# Patient Record
Sex: FEMALE | Race: WHITE | NOT HISPANIC OR LATINO | Employment: STUDENT | URBAN - METROPOLITAN AREA
[De-identification: names, ages, dates, MRNs, and addresses within clinical notes are randomized per-mention and may not be internally consistent; named-entity substitution may affect disease eponyms.]

---
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
## 2024-12-04 ENCOUNTER — INITIAL PRENATAL (OUTPATIENT)
Dept: OBSTETRICS AND GYNECOLOGY | Facility: CLINIC | Age: 19
End: 2024-12-04
Payer: COMMERCIAL

## 2024-12-04 VITALS — WEIGHT: 139 LBS | DIASTOLIC BLOOD PRESSURE: 60 MMHG | HEART RATE: 90 BPM | SYSTOLIC BLOOD PRESSURE: 108 MMHG

## 2024-12-04 DIAGNOSIS — Z86.19 HISTORY OF HERPES SIMPLEX INFECTION: ICD-10-CM

## 2024-12-04 DIAGNOSIS — Z11.3 ENCOUNTER FOR SCREENING FOR INFECTIONS WITH A PREDOMINANTLY SEXUAL MODE OF TRANSMISSION: ICD-10-CM

## 2024-12-04 DIAGNOSIS — Z3A.14 14 WEEKS GESTATION OF PREGNANCY: ICD-10-CM

## 2024-12-04 DIAGNOSIS — Z34.82 ENCOUNTER FOR SUPERVISION OF OTHER NORMAL PREGNANCY IN SECOND TRIMESTER: Primary | ICD-10-CM

## 2024-12-04 DIAGNOSIS — Z36.87 UNSURE OF LMP (LAST MENSTRUAL PERIOD) AS REASON FOR ULTRASOUND SCAN: ICD-10-CM

## 2024-12-04 NOTE — PROGRESS NOTES
Chief Complaint: New OB     HPI:      Salma Gold is a 19 y.o.  who presents to clinic for new ob. Initial Prenatal Visit NO C/O'S. PT DENIES VAGINAL BLEEDING AND CRAMPING.  Patient's last menstrual period was 2024.   Last pap No result found      Past Medical History:   Diagnosis Date    HSV (herpes simplex virus) anogenital infection      Past Surgical History:   Procedure Laterality Date    TUBE IN EARS       Social History     Tobacco Use    Smoking status: Never    Smokeless tobacco: Never   Substance Use Topics    Alcohol use: Not Currently    Drug use: Never     No family history on file.  OB History    Para Term  AB Living   2 1 1     1   SAB IAB Ectopic Multiple Live Births           1      # Outcome Date GA Lbr Sergio/2nd Weight Sex Type Anes PTL Lv   2 Current            1 Term 21 39w0d  3.6 kg (7 lb 15 oz) M Vag-Spont EPI N NICK     No current outpatient medications on file.      ROS:     Review of Systems     General/Constitutional:  Hot flash denies . Chills denies. Fatigue/weakness denies . Fever denies . Night sweats denies .  Respiratory:  Cough denies . Hemoptysis denies . SOB denies . Sputum production denies . Wheezing denies .  Breast:  Changes in skin of nipple or breast denies . Breast lump denies. Nipple discharge denies .  Cardiovascular:  Chest pain denies. Palpitations denies . Swelling in hands/feet denies .   Gastrointestinal:  Abdominal pain denies . Blood in stool denies.  GYN:  Vaginal bleeding denies . Vaginal discharge/ Odor denies . Vaginal lesion/pain denies Breakthrough bleeding denies . Pelvic pain denies . Decreased libido denies. Vulvar lesion/ulcer denies . Prolapse of genital organs denies . Heavy bleeding during menses denies. Irregular menses denies . Painful intercourse denies . Painful menses denies .  Genitourinary:  Incontinence denies . Blood in urine denies. Painful urination denies.  Musculoskeletal:  Joint/jc pain denies.  Decreased ROM denies. Muscle aches denies. Swollen joints denies . Weakness denies.  Neurologic:  Confusion denies. Trouble walking denies. Trouble with balance denies Balance difficulty denies. Gait abnormalities denies.Tingling/Numbness denies.  Psychiatric:  Serene denies. Homicidal thoughts denies. Suicidal thoughts denies. Mood lability denies. Personality changes denies. Anxiety or panic attacks denies. Auditory/visual hallucinations denies. Delusions denies. Depressed mood denies.     Physical Exam:   /60   Pulse 90   Wt 63 kg (139 lb)   LMP 08/27/2024   There is no height or weight on file to calculate BMI.   PHYSICAL EXAM:  Constitutional:  General Appearance : alert, in no acute distress, normal, well nourished.  Neck/Thyroid:  Inspection/Palpation: normal. Thyroid: normal size and shape.  Respiratory:  Auscultation: clear to auscultation bilaterally. Respiratory Effort: normal.  Breast:  NO BREAST EXAM TODAY  Gastrointestinal:  Abdomen: no masses. no tender, nondistended.  Liver and spleen: normal  Hernias: no hernias present, no inguinal adenopathy.  Genitourinary:  External Genitalia: normal, no lesions.  Vagina: normal appearance, no abnormal discharge, no lesions.  Bladder: no mass, nontender.  Urethra: no erythema or lesions present.  Cervix: no lesions, non tender. ONE SWAB COLLECTED  Uterus: nontender, normal contour, normal mobility, normal size.  Adnexa: no masses, no tenderness.  Anus and Perineum: visually normal.   Chaperone Present    Assessment/Plan:     Salma was seen today for initial prenatal visit.    Diagnoses and all orders for this visit:    Encounter for supervision of other normal pregnancy in second trimester  -     Cell-Free DNA Prenatal Screen; Future  -     Cell-Free DNA Prenatal Screen    14 weeks gestation of pregnancy  -     Cell-Free DNA Prenatal Screen; Future  -     Cell-Free DNA Prenatal Screen    Encounter for screening for infections with a predominantly sexual  mode of transmission  -     MDL Sendout Test    Unsure of LMP (last menstrual period) as reason for ultrasound scan  -     US OB/GYN Procedure ([facility]) - Extended List - Today    History of herpes simplex infection        Follow up in about 2 weeks (around 2024) for OB SEX US.    Counselin. Encouraged compliance with prenatal vitamins.  2. Discussed PNL and genetic testing.   3. Safety of exercise discussed with patient, and continued active lifestyle encouraged.  4. Normal course of prenatal care reviewed.  5. Medications safe in pregnancy discussed and list provided.    Use of the [facility] Patient Portal discussed and encouraged during today's visit.

## 2024-12-17 ENCOUNTER — PATIENT MESSAGE (OUTPATIENT)
Dept: OTHER | Facility: OTHER | Age: 19
End: 2024-12-17
Payer: COMMERCIAL

## 2024-12-18 ENCOUNTER — ROUTINE PRENATAL (OUTPATIENT)
Dept: OBSTETRICS AND GYNECOLOGY | Facility: CLINIC | Age: 19
End: 2024-12-18
Payer: COMMERCIAL

## 2024-12-18 VITALS — DIASTOLIC BLOOD PRESSURE: 60 MMHG | HEART RATE: 90 BPM | SYSTOLIC BLOOD PRESSURE: 108 MMHG | WEIGHT: 141 LBS

## 2024-12-18 DIAGNOSIS — Z34.82 ENCOUNTER FOR SUPERVISION OF OTHER NORMAL PREGNANCY IN SECOND TRIMESTER: Primary | ICD-10-CM

## 2024-12-18 DIAGNOSIS — O26.52 MATERNAL HYPOTENSION SYNDROME IN SECOND TRIMESTER: ICD-10-CM

## 2024-12-18 DIAGNOSIS — Z3A.16 16 WEEKS GESTATION OF PREGNANCY: ICD-10-CM

## 2024-12-18 DIAGNOSIS — Z86.19 HISTORY OF HERPES SIMPLEX INFECTION: ICD-10-CM

## 2024-12-18 LAB
BILIRUB UR QL STRIP: NEGATIVE
GLUCOSE UR QL STRIP: NEGATIVE
KETONES UR QL STRIP: NEGATIVE
LEUKOCYTE ESTERASE UR QL STRIP: NEGATIVE
PH, POC UA: 7.5
POC BLOOD, URINE: NEGATIVE
POC NITRATES, URINE: NEGATIVE
PROT UR QL STRIP: NEGATIVE
SP GR UR STRIP: 1.02 (ref 1–1.03)
UROBILINOGEN UR STRIP-ACNC: 0.2 (ref 0.1–1.1)

## 2024-12-18 PROCEDURE — 99499 UNLISTED E&M SERVICE: CPT | Mod: ,,, | Performed by: OBSTETRICS & GYNECOLOGY

## 2024-12-18 PROCEDURE — 82105 ALPHA-FETOPROTEIN SERUM: CPT | Performed by: OBSTETRICS & GYNECOLOGY

## 2024-12-18 RX ORDER — ONDANSETRON 8 MG/1
TABLET, ORALLY DISINTEGRATING ORAL
COMMUNITY

## 2024-12-18 NOTE — PROGRESS NOTES
HPI  Salma Gold is a 19 y.o.  16w1d here for Routine Prenatal Visit   PRESENTS TO CLINIC C/O FEELS LIKE I WANT TO PASS OUT AT TIMES, DELIA WHEN RISING FROM SITTING TO STANDING.  FOR SEX US  Denies VB and pelvic pain.    Salma's allergies were reviewed and updated as appropriate.    Past Medical History:   Diagnosis Date    HSV (herpes simplex virus) anogenital infection      No family history on file.  Social History     Tobacco Use    Smoking status: Never    Smokeless tobacco: Never   Substance Use Topics    Alcohol use: Not Currently    Drug use: Never     OB History    Para Term  AB Living   2 1 1     1   SAB IAB Ectopic Multiple Live Births           1      # Outcome Date GA Lbr Sergio/2nd Weight Sex Type Anes PTL Lv   2 Current            1 Term 21 39w0d  3.6 kg (7 lb 15 oz) M Vag-Spont EPI N NICK       Current Outpatient Medications:     ondansetron (ZOFRAN-ODT) 8 MG TbDL, DISSOLVE 1 TABLET IN MOUTH EVERY 8 HOURS AS NEEDED (Patient not taking: Reported on 1/15/2025), Disp: , Rfl:     ROS:  A comprehensive review of symptoms was completed and negative except as noted above.    Physical Exam:  Chaperone present for exam.    /60   Pulse 90   Wt 64 kg (141 lb)   LMP 2024     Gen: No distress.  Abdomen: Gravid, non-tender.  Pelvic: DEFERRED  Extremities: No edema.    Fetal Heart Rate: 158      ASSESSMENT/PLAN:  1. Encounter for supervision of other normal pregnancy in second trimester  -     POCT Urinalysis, Dipstick, Automated, W/O Scope    2. 16 weeks gestation of pregnancy  -     [place] GENERIC ORDERABLE [email or web address]    3. History of herpes simplex infection    4. Maternal hypotension syndrome in second trimester--DISCUSSED INCREASING FLUIDS AND OTHER MEASURES TO HELP WITH SYMPTOMS    Miscarriage precautions discussed with patient, as well as labor unit precautions.     Follow Up:  Follow up in about 4 weeks (around 1/15/2025) for ANATOMY US.

## 2024-12-23 LAB — MAYO GENERIC ORDERABLE RESULT: NORMAL

## 2024-12-24 ENCOUNTER — PATIENT MESSAGE (OUTPATIENT)
Dept: OTHER | Facility: OTHER | Age: 19
End: 2024-12-24
Payer: COMMERCIAL

## 2025-01-14 ENCOUNTER — PATIENT MESSAGE (OUTPATIENT)
Dept: OTHER | Facility: OTHER | Age: 20
End: 2025-01-14
Payer: COMMERCIAL

## 2025-01-15 ENCOUNTER — ROUTINE PRENATAL (OUTPATIENT)
Dept: OBSTETRICS AND GYNECOLOGY | Facility: CLINIC | Age: 20
End: 2025-01-15
Payer: COMMERCIAL

## 2025-01-15 VITALS — DIASTOLIC BLOOD PRESSURE: 60 MMHG | WEIGHT: 147 LBS | SYSTOLIC BLOOD PRESSURE: 110 MMHG | HEART RATE: 90 BPM

## 2025-01-15 DIAGNOSIS — Z36.3 ENCOUNTER FOR ROUTINE SCREENING FOR MALFORMATION USING ULTRASONICS: ICD-10-CM

## 2025-01-15 DIAGNOSIS — Z3A.20 20 WEEKS GESTATION OF PREGNANCY: ICD-10-CM

## 2025-01-15 DIAGNOSIS — Z34.82 ENCOUNTER FOR SUPERVISION OF OTHER NORMAL PREGNANCY IN SECOND TRIMESTER: Primary | ICD-10-CM

## 2025-01-15 DIAGNOSIS — R31.9 HEMATURIA, UNSPECIFIED TYPE: ICD-10-CM

## 2025-01-15 DIAGNOSIS — Z86.19 HISTORY OF HERPES SIMPLEX INFECTION: ICD-10-CM

## 2025-01-15 LAB
BILIRUB UR QL STRIP: NEGATIVE
GLUCOSE UR QL STRIP: NEGATIVE
KETONES UR QL STRIP: NEGATIVE
LEUKOCYTE ESTERASE UR QL STRIP: NEGATIVE
PH, POC UA: 7
POC BLOOD, URINE: POSITIVE
POC NITRATES, URINE: NEGATIVE
PROT UR QL STRIP: NEGATIVE
SP GR UR STRIP: 1.02 (ref 1–1.03)
UROBILINOGEN UR STRIP-ACNC: 1 (ref 0.1–1.1)

## 2025-01-15 PROCEDURE — 99499 UNLISTED E&M SERVICE: CPT | Mod: ,,, | Performed by: NURSE PRACTITIONER

## 2025-01-15 PROCEDURE — 87086 URINE CULTURE/COLONY COUNT: CPT | Performed by: NURSE PRACTITIONER

## 2025-01-15 NOTE — PROGRESS NOTES
"HPI  Salma Gold is a 19 y.o.  20w1d here for Routine Prenatal Visit (NO C/O'S.)  Denies VB and pelvic pain.  Salma's allergies were reviewed and updated as appropriate.  Past Medical History:   Diagnosis Date    HSV (herpes simplex virus) anogenital infection    No family history on file.  Social History     Tobacco Use    Smoking status: Never    Smokeless tobacco: Never   Substance Use Topics    Alcohol use: Not Currently    Drug use: Never     OB History    Para Term  AB Living   2 1 1     1   SAB IAB Ectopic Multiple Live Births           1      # Outcome Date GA Lbr Sergio/2nd Weight Sex Type Anes PTL Lv   2 Current            1 Term 21 39w0d  3.6 kg (7 lb 15 oz) M Vag-Spont EPI N NICK     Current Outpatient Medications:     ondansetron (ZOFRAN-ODT) 8 MG TbDL, DISSOLVE 1 TABLET IN MOUTH EVERY 8 HOURS AS NEEDED (Patient not taking: Reported on 1/15/2025), Disp: , Rfl:     ROS:  A comprehensive review of symptoms was completed and negative except as noted above.    Physical Exam:  Chaperone present for exam.    /60   Pulse 90   Wt 66.7 kg (147 lb)   LMP 2024     Gen: No distress.  Abdomen: Gravid, non-tender.  Pelvic:   Extremities: No edema.    Fundal Height (cm): 19 cm  Fetal Heart Rate: 156  Movement: Present    Recent Results (from the past 24 hours)   POCT Urinalysis, Dipstick, Automated, W/O Scope    Collection Time: 01/15/25 11:03 AM   Result Value Ref Range    POC Blood, Urine Positive (A) Negative    POC Bilirubin, Urine Negative Negative    POC Urobilinogen, Urine 1.0 0.1 - 1.1    POC Ketones, Urine Negative Negative    POC Protein, Urine Negative Negative    POC Nitrates, Urine Negative Negative    POC Glucose, Urine Negative Negative    pH, UA 7.0     POC Specific Gravity, Urine 1.020 1.003 - 1.029    POC Leukocytes, Urine Negative Negative     PRENATAL LABS:  ABO/Rh: No results found for: "GROUPTRH", "ABORH", "ABSCREEN"  H&H:  Lab Results   Component " "Value Date/Time    HGB 12.9 01/13/2021 02:26 PM    HCT 40.2 01/13/2021 02:26 PM     Platelet:  Lab Results   Component Value Date/Time     01/13/2021 02:26 PM     Osulivan: No results found for: "GMPR5NA", "SEPL0VK", "VKYQ3LE"  HIV: No results found for: "HIV", "SNZ67CCLJ", "HIVSARESULT", "XCGPZ1WNHHQA", "HIVSAINTERP", "HIVRAPID", "HIV1X2"  RPR:No results found for: "RPR", "SYPHAB"  Hepatitis B Surface Antigen:No results found for: "HEPBSAG"  Hepatitis C:No results found for: "HEPCAB"  Rubella Immune Status:No results found for: "RUBELLAIMMUN", "RUBABIGG", "RUBABIGGINDX", "RUBELLAIGG"  GBS:No results found for: "SREPBPCR", "STREPBCULT", "STREPONLY"   Last PAP Date: No result found  ASSESSMENT/PLAN:  1. Encounter for supervision of other normal pregnancy in second trimester  -     POCT Urinalysis, Dipstick, Automated, W/O Scope    2. 20 weeks gestation of pregnancy    3. History of herpes simplex infection    4. Encounter for routine screening for malformation using ultrasonics  -     US OB 14+ Wks, TransAbd, Single Gestation; Future; Expected date: 01/20/2025    5. Hematuria, unspecified type  -     Urine Culture High Risk    Miscarriage precautions discussed with patient, as well as labor unit precautions.   Follow Up:  Follow up in about 4 weeks (around 2/12/2025) for OB FOLLOW UP.   "

## 2025-01-18 LAB — BACTERIA UR CULT: NO GROWTH

## 2025-01-24 ENCOUNTER — HOSPITAL ENCOUNTER (OUTPATIENT)
Dept: RADIOLOGY | Facility: HOSPITAL | Age: 20
Discharge: HOME | End: 2025-01-24
Attending: NURSE PRACTITIONER
Payer: COMMERCIAL

## 2025-01-24 DIAGNOSIS — Z36.3 ENCOUNTER FOR ROUTINE SCREENING FOR MALFORMATION USING ULTRASONICS: ICD-10-CM

## 2025-01-24 PROCEDURE — 76805 OB US >/= 14 WKS SNGL FETUS: CPT | Mod: TC

## 2025-02-11 ENCOUNTER — PATIENT MESSAGE (OUTPATIENT)
Dept: OTHER | Facility: OTHER | Age: 20
End: 2025-02-11
Payer: COMMERCIAL

## 2025-02-12 ENCOUNTER — ROUTINE PRENATAL (OUTPATIENT)
Dept: OBSTETRICS AND GYNECOLOGY | Facility: CLINIC | Age: 20
End: 2025-02-12
Payer: COMMERCIAL

## 2025-02-12 VITALS — HEART RATE: 90 BPM | WEIGHT: 153 LBS | SYSTOLIC BLOOD PRESSURE: 102 MMHG | DIASTOLIC BLOOD PRESSURE: 60 MMHG

## 2025-02-12 DIAGNOSIS — Z34.83 ENCOUNTER FOR SUPERVISION OF OTHER NORMAL PREGNANCY IN THIRD TRIMESTER: Primary | ICD-10-CM

## 2025-02-12 DIAGNOSIS — Z3A.24 24 WEEKS GESTATION OF PREGNANCY: ICD-10-CM

## 2025-02-12 DIAGNOSIS — Z86.19 HISTORY OF HERPES SIMPLEX INFECTION: ICD-10-CM

## 2025-02-12 LAB
BILIRUB UR QL STRIP: NEGATIVE
GLUCOSE UR QL STRIP: NEGATIVE
KETONES UR QL STRIP: NEGATIVE
LEUKOCYTE ESTERASE UR QL STRIP: NEGATIVE
PH, POC UA: 7.5
POC BLOOD, URINE: NEGATIVE
POC NITRATES, URINE: NEGATIVE
PROT UR QL STRIP: NEGATIVE
SP GR UR STRIP: 1.02 (ref 1–1.03)
UROBILINOGEN UR STRIP-ACNC: 1 (ref 0.1–1.1)

## 2025-02-12 NOTE — PROGRESS NOTES
"HPI  Salma Gold is a 19 y.o.   24w1d here for Routine Prenatal Visit (NO C/O'S.)    Denies any VB, ROM, and PIH sxs.    Salma's allergies were reviewed and updated as appropriate.    Past Medical History:   Diagnosis Date    HSV (herpes simplex virus) anogenital infection      No family history on file.  Social History     Tobacco Use    Smoking status: Never    Smokeless tobacco: Never   Substance Use Topics    Alcohol use: Not Currently    Drug use: Never     OB History    Para Term  AB Living   2 1 1     1   SAB IAB Ectopic Multiple Live Births           1      # Outcome Date GA Lbr Sergio/2nd Weight Sex Type Anes PTL Lv   2 Current            1 Term 21 39w0d  3.6 kg (7 lb 15 oz) M Vag-Spont EPI N NICK       Current Outpatient Medications:     ondansetron (ZOFRAN-ODT) 8 MG TbDL, DISSOLVE 1 TABLET IN MOUTH EVERY 8 HOURS AS NEEDED (Patient not taking: Reported on 2025), Disp: , Rfl:     ROS:  A comprehensive review of symptoms was completed and negative except as noted above.    Physical Exam:  Chaperone present for exam.    /60   Pulse 90   Wt 69.4 kg (153 lb)   LMP 2024     Gen: No distress  Abdomen: Gravid, non-tender  Pelvic:   Extremities: No edema    Fetal Heart Rate: 142  Movement: Present    Recent Results (from the past 24 hours)   POCT Urinalysis, Dipstick, Automated, W/O Scope    Collection Time: 25 10:02 AM   Result Value Ref Range    POC Blood, Urine Negative Negative    POC Bilirubin, Urine Negative Negative    POC Urobilinogen, Urine 1.0 0.1 - 1.1    POC Ketones, Urine Negative Negative    POC Protein, Urine Negative Negative    POC Nitrates, Urine Negative Negative    POC Glucose, Urine Negative Negative    pH, UA 7.5     POC Specific Gravity, Urine 1.020 1.003 - 1.029    POC Leukocytes, Urine Negative Negative     ABO/Rh: No results found for: "GROUPTRH", "ABORH", "ABSCREEN"  H&H:  Lab Results   Component Value Date/Time    HGB 12.9 " "01/13/2021 02:26 PM    HCT 40.2 01/13/2021 02:26 PM     Platelet:  Lab Results   Component Value Date/Time     01/13/2021 02:26 PM     Osulivan: No results found for: "CVGA8AD", "WIBR1LI", "QBKE3KQ"  HIV: No results found for: "HIV", "MIK65SVEP", "HIVSARESULT", "VOCPC1IPTDUH", "HIVSAINTERP", "HIVRAPID", "HIV1X2"  RPR:No results found for: "RPR", "SYPHAB"  Hepatitis B Surface Antigen:No results found for: "HEPBSAG"  Hepatitis C:No results found for: "HEPCAB"  Rubella Immune Status:No results found for: "RUBELLAIMMUN", "RUBABIGG", "RUBABIGGINDX", "RUBELLAIGG"  GBS:No results found for: "SREPBPCR", "STREPBCULT", "STREPONLY"   Last PAP Date: No result found    ASSESSMENT/PLAN:    1. Encounter for supervision of other normal pregnancy in third trimester  -     POCT Urinalysis, Dipstick, Automated, W/O Scope    2. 24 weeks gestation of pregnancy    3. History of herpes simplex infection      Labor unit and pre-eclampsia precautions given.  Fetal kick count instructions given to patient.     Follow Up:  Follow up in about 4 weeks (around 3/12/2025) for OB FOLLOW UP.      "

## 2025-02-25 ENCOUNTER — PATIENT MESSAGE (OUTPATIENT)
Dept: OTHER | Facility: OTHER | Age: 20
End: 2025-02-25
Payer: COMMERCIAL

## 2025-03-11 ENCOUNTER — PATIENT MESSAGE (OUTPATIENT)
Dept: OTHER | Facility: OTHER | Age: 20
End: 2025-03-11
Payer: COMMERCIAL

## 2025-03-13 ENCOUNTER — ROUTINE PRENATAL (OUTPATIENT)
Dept: OBSTETRICS AND GYNECOLOGY | Facility: CLINIC | Age: 20
End: 2025-03-13
Payer: COMMERCIAL

## 2025-03-13 VITALS — WEIGHT: 165 LBS | HEART RATE: 92 BPM | SYSTOLIC BLOOD PRESSURE: 118 MMHG | DIASTOLIC BLOOD PRESSURE: 60 MMHG

## 2025-03-13 DIAGNOSIS — Z3A.28 28 WEEKS GESTATION OF PREGNANCY: ICD-10-CM

## 2025-03-13 DIAGNOSIS — Z34.83 ENCOUNTER FOR SUPERVISION OF OTHER NORMAL PREGNANCY IN THIRD TRIMESTER: Primary | ICD-10-CM

## 2025-03-13 DIAGNOSIS — Z86.19 HISTORY OF HERPES SIMPLEX INFECTION: ICD-10-CM

## 2025-03-13 DIAGNOSIS — Z71.85 VACCINE COUNSELING: ICD-10-CM

## 2025-03-13 LAB
BILIRUB UR QL STRIP: NEGATIVE
ERYTHROCYTE [DISTWIDTH] IN BLOOD BY AUTOMATED COUNT: 13.1 % (ref 11–14.5)
GLUCOSE 1H P 100 G GLC PO SERPL-MCNC: 69 MG/DL (ref 100–180)
GLUCOSE UR QL STRIP: NEGATIVE
HCT VFR BLD AUTO: 32 % (ref 36–48)
HGB BLD-MCNC: 10.9 G/DL (ref 11.8–16)
KETONES UR QL STRIP: NEGATIVE
LEUKOCYTE ESTERASE UR QL STRIP: NEGATIVE
MCH RBC QN AUTO: 29.9 PG (ref 27–34)
MCHC RBC AUTO-ENTMCNC: 34.1 G/DL (ref 31–37)
MCV RBC AUTO: 87.7 FL (ref 79–99)
NRBC BLD AUTO-RTO: 0 %
PH, POC UA: 7.5
PLATELET # BLD AUTO: 274 X10(3)/MCL (ref 140–371)
PMV BLD AUTO: 9.7 FL (ref 9.4–12.4)
POC BLOOD, URINE: NEGATIVE
POC NITRATES, URINE: NEGATIVE
PROT UR QL STRIP: NEGATIVE
RBC # BLD AUTO: 3.65 X10(6)/MCL (ref 4–5.1)
SP GR UR STRIP: 1.01 (ref 1–1.03)
T PALLIDUM AB SER QL: NONREACTIVE
UROBILINOGEN UR STRIP-ACNC: 0.2 (ref 0.1–1.1)
WBC # BLD AUTO: 8.95 X10(3)/MCL (ref 4–11.5)

## 2025-03-13 PROCEDURE — 82950 GLUCOSE TEST: CPT | Performed by: OBSTETRICS & GYNECOLOGY

## 2025-03-13 PROCEDURE — 85027 COMPLETE CBC AUTOMATED: CPT | Performed by: OBSTETRICS & GYNECOLOGY

## 2025-03-13 PROCEDURE — 86780 TREPONEMA PALLIDUM: CPT | Performed by: OBSTETRICS & GYNECOLOGY

## 2025-03-13 RX ORDER — VALACYCLOVIR HYDROCHLORIDE 500 MG/1
500 TABLET, FILM COATED ORAL DAILY
COMMUNITY
End: 2025-04-21

## 2025-03-13 NOTE — PROGRESS NOTES
HPI  Salma Gold is a 19 y.o.   28w2d here for Routine Prenatal Visit.      VACCINE COUNSELING WITH PT. PT. DECLINES COVID AND FLU VACCINE AT LOCAL PHARMACY,BUT STATES WILL GET T-DAP VACCINE AT HER PHARMACY. INFORMATION GIVEN REGARDING VACCINES.  PT. STATES HAD HSV OUTBREAK LAST WEEK AND TOOK VALTREX AND IS RESOLVED.  PT. IS PRESENTLY NOT TAKING PNV. INSTRUCTED ON IMPORTANCE OF TAKING PNV. INSTRUCTED TO GET OTC PNV WITH FOLIC ACID AND DHA AND TAKE AS DIRECTED.    Denies any VB, ROM, and PIH sxs. Reports active fetal movements.     Salma's allergies were reviewed and updated as appropriate.    Past Medical History:   Diagnosis Date    HSV (herpes simplex virus) anogenital infection      Family History   Problem Relation Name Age of Onset    Cancer Paternal Grandfather      Cancer Maternal Grandmother       Social History[1]  OB History    Para Term  AB Living   2 1 1   1   SAB IAB Ectopic Multiple Live Births       1      # Outcome Date GA Lbr Sergio/2nd Weight Sex Type Anes PTL Lv   2 Current            1 Term 21 39w0d  3.6 kg (7 lb 15 oz) M Vag-Spont EPI N NICK     Current Medications[2]    ROS:  A comprehensive review of symptoms was completed and negative except as noted above.    Physical Exam:    Chaperone present for exam.    /60   Pulse 92   Wt 74.8 kg (165 lb)   LMP 2024     Gen: No distress  Abdomen: Gravid, non-tender  Pelvic: DEFERRED  Extremities: No edema    Fundal Height (cm): 28 cm  Fetal Heart Rate: 145  Movement: Present      ASSESSMENT/PLAN:    1. Encounter for supervision of other normal pregnancy in third trimester  -     POCT Urinalysis, Dipstick, Automated, W/O Scope    2. 28 weeks gestation of pregnancy  -     SYPHILIS ANTIBODY (WITH REFLEX RPR)  -     GTT 1 Hour  -     CBC Without Differential    3. History of herpes simplex infection    4. Vaccine counseling      Labor unit and pre-eclampsia precautions given.  Fetal kick count instructions  given to patient.     Follow Up:  Follow up in about 4 weeks (around 4/10/2025) for OB VS/GROWTH U/S.             [1]   Social History  Tobacco Use    Smoking status: Never    Smokeless tobacco: Never   Substance Use Topics    Alcohol use: Not Currently    Drug use: Never   [2]   Current Outpatient Medications:     ascorbic acid, vitamin C, (VITAMIN C) 500 MG tablet, Take 1 tablet (500 mg total) by mouth 2 (two) times daily. (Patient not taking: Reported on 4/21/2025), Disp: 60 tablet, Rfl: 3    docusate sodium (COLACE) 100 MG capsule, Take 1 capsule (100 mg total) by mouth 2 (two) times daily. (Patient not taking: Reported on 4/21/2025), Disp: 60 capsule, Rfl: 3    ferrous sulfate 325 (65 FE) MG EC tablet, Take 1 tablet (325 mg total) by mouth 2 (two) times daily., Disp: 60 tablet, Rfl: 3    folic acid (FOLVITE) 1 MG tablet, Take 1 tablet (1 mg total) by mouth once daily., Disp: 30 tablet, Rfl: 5    valACYclovir (VALTREX) 500 MG tablet, Take 1 tablet (500 mg total) by mouth once daily., Disp: 30 tablet, Rfl: 3

## 2025-03-17 ENCOUNTER — RESULTS FOLLOW-UP (OUTPATIENT)
Dept: OBSTETRICS AND GYNECOLOGY | Facility: HOSPITAL | Age: 20
End: 2025-03-17

## 2025-03-25 ENCOUNTER — PATIENT MESSAGE (OUTPATIENT)
Dept: OTHER | Facility: OTHER | Age: 20
End: 2025-03-25
Payer: COMMERCIAL

## 2025-04-08 ENCOUNTER — PATIENT MESSAGE (OUTPATIENT)
Dept: OTHER | Facility: OTHER | Age: 20
End: 2025-04-08
Payer: COMMERCIAL

## 2025-04-10 ENCOUNTER — ROUTINE PRENATAL (OUTPATIENT)
Dept: OBSTETRICS AND GYNECOLOGY | Facility: CLINIC | Age: 20
End: 2025-04-10
Payer: COMMERCIAL

## 2025-04-10 VITALS — DIASTOLIC BLOOD PRESSURE: 60 MMHG | WEIGHT: 177.81 LBS | SYSTOLIC BLOOD PRESSURE: 100 MMHG | HEART RATE: 90 BPM

## 2025-04-10 DIAGNOSIS — Z86.19 HISTORY OF HERPES SIMPLEX INFECTION: ICD-10-CM

## 2025-04-10 DIAGNOSIS — R04.0 EPISTAXIS: ICD-10-CM

## 2025-04-10 DIAGNOSIS — Z23 NEED FOR TDAP VACCINATION: ICD-10-CM

## 2025-04-10 DIAGNOSIS — Z3A.32 32 WEEKS GESTATION OF PREGNANCY: ICD-10-CM

## 2025-04-10 DIAGNOSIS — Z34.83 ENCOUNTER FOR SUPERVISION OF OTHER NORMAL PREGNANCY IN THIRD TRIMESTER: Primary | ICD-10-CM

## 2025-04-10 DIAGNOSIS — Z36.89 ENCOUNTER FOR ULTRASOUND TO ASSESS FETAL GROWTH: ICD-10-CM

## 2025-04-10 DIAGNOSIS — O26.00 EXCESSIVE WEIGHT GAIN DURING PREGNANCY, ANTEPARTUM: ICD-10-CM

## 2025-04-10 LAB
BASOPHILS # BLD AUTO: 0.03 X10(3)/MCL (ref 0.01–0.08)
BASOPHILS NFR BLD AUTO: 0.3 % (ref 0.1–1.2)
BILIRUB UR QL STRIP: NEGATIVE
EOSINOPHIL # BLD AUTO: 0.11 X10(3)/MCL (ref 0.04–0.36)
EOSINOPHIL NFR BLD AUTO: 1 % (ref 0.7–7)
ERYTHROCYTE [DISTWIDTH] IN BLOOD BY AUTOMATED COUNT: 13.2 % (ref 11–14.5)
GLUCOSE UR QL STRIP: NEGATIVE
HCT VFR BLD AUTO: 29.4 % (ref 36–48)
HGB BLD-MCNC: 10 G/DL (ref 11.8–16)
IMM GRANULOCYTES # BLD AUTO: 0.13 X10(3)/MCL (ref 0–0.03)
IMM GRANULOCYTES NFR BLD AUTO: 1.1 % (ref 0–0.5)
KETONES UR QL STRIP: NEGATIVE
LEUKOCYTE ESTERASE UR QL STRIP: NEGATIVE
LYMPHOCYTES # BLD AUTO: 2.15 X10(3)/MCL (ref 1.16–3.74)
LYMPHOCYTES NFR BLD AUTO: 18.8 % (ref 20–55)
MCH RBC QN AUTO: 28.9 PG (ref 27–34)
MCHC RBC AUTO-ENTMCNC: 34 G/DL (ref 31–37)
MCV RBC AUTO: 85 FL (ref 79–99)
MONOCYTES # BLD AUTO: 0.95 X10(3)/MCL (ref 0.24–0.36)
MONOCYTES NFR BLD AUTO: 8.3 % (ref 4.7–12.5)
NEUTROPHILS # BLD AUTO: 8.05 X10(3)/MCL (ref 1.56–6.13)
NEUTROPHILS NFR BLD AUTO: 70.5 % (ref 37–73)
NRBC BLD AUTO-RTO: 0 %
PH, POC UA: 6.5
PLATELET # BLD AUTO: 273 X10(3)/MCL (ref 140–371)
PMV BLD AUTO: 9.3 FL (ref 9.4–12.4)
POC BLOOD, URINE: NEGATIVE
POC NITRATES, URINE: NEGATIVE
PROT UR QL STRIP: NEGATIVE
RBC # BLD AUTO: 3.46 X10(6)/MCL (ref 4–5.1)
SP GR UR STRIP: 1.01 (ref 1–1.03)
UROBILINOGEN UR STRIP-ACNC: 0.2 (ref 0.1–1.1)
WBC # BLD AUTO: 11.42 X10(3)/MCL (ref 4–11.5)

## 2025-04-10 PROCEDURE — 85025 COMPLETE CBC W/AUTO DIFF WBC: CPT | Performed by: OBSTETRICS & GYNECOLOGY

## 2025-04-10 PROCEDURE — 76816 OB US FOLLOW-UP PER FETUS: CPT | Mod: ,,, | Performed by: OBSTETRICS & GYNECOLOGY

## 2025-04-10 NOTE — PROGRESS NOTES
HPI  Salma Gold is a 19 y.o.   32w2d here for Routine Prenatal Visit (PT C/O NOSE BLEED LAST NIGHT. )    PT IS HERE FOR A GROWTH ULTRASOUND. PT STATES SHE BEGAN WITH A NOSE BLEED LAST NIGHT, UNSURE OF WHAT CAUSED IT. PT WILL RECEIVE TDAP AT TODAY'S VISIT.     Denies any VB, ROM, and PIH sxs. Reports active fetal movements.     Salma's allergies were reviewed and updated as appropriate.    Past Medical History:   Diagnosis Date    HSV (herpes simplex virus) anogenital infection      Family History   Problem Relation Name Age of Onset    Cancer Paternal Grandfather      Cancer Maternal Grandmother       Social History[1]  OB History    Para Term  AB Living   2 1 1   1   SAB IAB Ectopic Multiple Live Births       1      # Outcome Date GA Lbr Sergio/2nd Weight Sex Type Anes PTL Lv   2 Current            1 Term 21 39w0d  3.6 kg (7 lb 15 oz) M Vag-Spont EPI N NICK     Current Medications[2]    ROS:  A comprehensive review of symptoms was completed and negative except as noted above.    Physical Exam:    Chaperone present for exam.    /60   Pulse 90   Wt 80.6 kg (177 lb 12.8 oz)   LMP 2024     Gen: No distress  Abdomen: Gravid, non-tender  Pelvic: DEFERRED  Extremities: No edema    Fetal Heart Rate: 154  Presentation: Vertex    Last PAP Date: No result found    ASSESSMENT/PLAN:    1. Encounter for supervision of other normal pregnancy in third trimester  -     POCT Urinalysis, Dipstick, Automated, W/O Scope    2. 32 weeks gestation of pregnancy    3. History of herpes simplex infection    4. Encounter for ultrasound to assess fetal growth  -     US OB/GYN Procedure ([facility]) - Extended List - Future    5. Need for Tdap vaccination  -     Tdap (BOOSTRIX) vaccine injection 0.5 mL    6. Excessive weight gain during pregnancy, antepartum    7. Epistaxis  -     CBC Auto Differential      Labor unit and pre-eclampsia precautions given.  Fetal kick count instructions given to  patient.     Follow Up:  No follow-ups on file.            [1]   Social History  Tobacco Use    Smoking status: Never    Smokeless tobacco: Never   Substance Use Topics    Alcohol use: Not Currently    Drug use: Never   [2]   Current Outpatient Medications:     ascorbic acid, vitamin C, (VITAMIN C) 500 MG tablet, Take 1 tablet (500 mg total) by mouth 2 (two) times daily. (Patient not taking: Reported on 4/21/2025), Disp: 60 tablet, Rfl: 3    docusate sodium (COLACE) 100 MG capsule, Take 1 capsule (100 mg total) by mouth 2 (two) times daily. (Patient not taking: Reported on 4/21/2025), Disp: 60 capsule, Rfl: 3    ferrous sulfate 325 (65 FE) MG EC tablet, Take 1 tablet (325 mg total) by mouth 2 (two) times daily., Disp: 60 tablet, Rfl: 3    folic acid (FOLVITE) 1 MG tablet, Take 1 tablet (1 mg total) by mouth once daily., Disp: 30 tablet, Rfl: 5    valACYclovir (VALTREX) 500 MG tablet, Take 1 tablet (500 mg total) by mouth once daily., Disp: 30 tablet, Rfl: 3

## 2025-04-14 ENCOUNTER — RESULTS FOLLOW-UP (OUTPATIENT)
Dept: OBSTETRICS AND GYNECOLOGY | Facility: HOSPITAL | Age: 20
End: 2025-04-14

## 2025-04-14 DIAGNOSIS — O99.013 ANEMIA DURING PREGNANCY IN THIRD TRIMESTER: Primary | ICD-10-CM

## 2025-04-14 RX ORDER — FOLIC ACID 1 MG/1
1 TABLET ORAL DAILY
Qty: 30 TABLET | Refills: 5 | Status: SHIPPED | OUTPATIENT
Start: 2025-04-14 | End: 2026-04-14

## 2025-04-14 RX ORDER — DOCUSATE SODIUM 100 MG/1
100 CAPSULE, LIQUID FILLED ORAL 2 TIMES DAILY
Qty: 60 CAPSULE | Refills: 3 | Status: SHIPPED | OUTPATIENT
Start: 2025-04-14 | End: 2026-04-14

## 2025-04-14 RX ORDER — FERROUS SULFATE 325(65) MG
325 TABLET, DELAYED RELEASE (ENTERIC COATED) ORAL 2 TIMES DAILY
Qty: 60 TABLET | Refills: 3 | Status: SHIPPED | OUTPATIENT
Start: 2025-04-14

## 2025-04-14 RX ORDER — ASCORBIC ACID 500 MG
500 TABLET ORAL 2 TIMES DAILY
Qty: 60 TABLET | Refills: 3 | Status: SHIPPED | OUTPATIENT
Start: 2025-04-14 | End: 2026-04-14

## 2025-04-15 ENCOUNTER — HOSPITAL ENCOUNTER (OUTPATIENT)
Facility: HOSPITAL | Age: 20
Discharge: HOME | End: 2025-04-15
Attending: OBSTETRICS & GYNECOLOGY | Admitting: OBSTETRICS & GYNECOLOGY
Payer: COMMERCIAL

## 2025-04-15 ENCOUNTER — TELEPHONE (OUTPATIENT)
Dept: OBSTETRICS AND GYNECOLOGY | Facility: CLINIC | Age: 20
End: 2025-04-15
Payer: COMMERCIAL

## 2025-04-15 VITALS — SYSTOLIC BLOOD PRESSURE: 124 MMHG | DIASTOLIC BLOOD PRESSURE: 64 MMHG | HEART RATE: 83 BPM

## 2025-04-15 DIAGNOSIS — O47.9 IRREGULAR UTERINE CONTRACTIONS: ICD-10-CM

## 2025-04-15 LAB
ALBUMIN SERPL-MCNC: 3.7 G/DL (ref 3.4–5)
ALBUMIN/GLOB SERPL: 1.3 RATIO
ALP SERPL-CCNC: 61 UNIT/L (ref 50–144)
ALT SERPL-CCNC: 12 UNIT/L (ref 1–45)
ANION GAP SERPL CALC-SCNC: 4 MEQ/L (ref 2–13)
AST SERPL-CCNC: 21 UNIT/L (ref 14–36)
BASOPHILS # BLD AUTO: 0.03 X10(3)/MCL (ref 0.01–0.08)
BASOPHILS NFR BLD AUTO: 0.3 % (ref 0.1–1.2)
BILIRUB SERPL-MCNC: 0.3 MG/DL (ref 0–1)
BILIRUB UR QL STRIP.AUTO: NEGATIVE
BUN SERPL-MCNC: 6 MG/DL (ref 7–20)
CALCIUM SERPL-MCNC: 9 MG/DL (ref 8.4–10.2)
CHLORIDE SERPL-SCNC: 106 MMOL/L (ref 98–110)
CLARITY UR: CLEAR
CO2 SERPL-SCNC: 23 MMOL/L (ref 21–32)
COLOR UR AUTO: YELLOW
CREAT SERPL-MCNC: 0.47 MG/DL (ref 0.66–1.25)
CREAT/UREA NIT SERPL: 13 (ref 12–20)
EOSINOPHIL # BLD AUTO: 0.08 X10(3)/MCL (ref 0.04–0.36)
EOSINOPHIL NFR BLD AUTO: 0.9 % (ref 0.7–7)
ERYTHROCYTE [DISTWIDTH] IN BLOOD BY AUTOMATED COUNT: 13.5 % (ref 11–14.5)
GFR SERPLBLD CREATININE-BSD FMLA CKD-EPI: >90 ML/MIN/1.73/M2
GLOBULIN SER-MCNC: 2.9 GM/DL (ref 2–3.9)
GLUCOSE SERPL-MCNC: 80 MG/DL (ref 70–115)
GLUCOSE UR QL STRIP: NEGATIVE
HCT VFR BLD AUTO: 30.2 % (ref 36–48)
HGB BLD-MCNC: 9.8 G/DL (ref 11.8–16)
HGB UR QL STRIP: NEGATIVE
IMM GRANULOCYTES # BLD AUTO: 0.16 X10(3)/MCL (ref 0–0.03)
IMM GRANULOCYTES NFR BLD AUTO: 1.7 % (ref 0–0.5)
KETONES UR QL STRIP: NEGATIVE
LEUKOCYTE ESTERASE UR QL STRIP: NEGATIVE
LYMPHOCYTES # BLD AUTO: 1.25 X10(3)/MCL (ref 1.16–3.74)
LYMPHOCYTES NFR BLD AUTO: 13.5 % (ref 20–55)
MCH RBC QN AUTO: 28 PG (ref 27–34)
MCHC RBC AUTO-ENTMCNC: 32.5 G/DL (ref 31–37)
MCV RBC AUTO: 86.3 FL (ref 79–99)
MONOCYTES # BLD AUTO: 0.51 X10(3)/MCL (ref 0.24–0.36)
MONOCYTES NFR BLD AUTO: 5.5 % (ref 4.7–12.5)
NEUTROPHILS # BLD AUTO: 7.26 X10(3)/MCL (ref 1.56–6.13)
NEUTROPHILS NFR BLD AUTO: 78.1 % (ref 37–73)
NITRITE UR QL STRIP: NEGATIVE
NRBC BLD AUTO-RTO: 0 %
PH UR STRIP: 7 [PH]
PLATELET # BLD AUTO: 240 X10(3)/MCL (ref 140–371)
PMV BLD AUTO: 8.8 FL (ref 9.4–12.4)
POTASSIUM SERPL-SCNC: 4.1 MMOL/L (ref 3.5–5.1)
PROT SERPL-MCNC: 6.6 GM/DL (ref 6.3–8.2)
PROT UR QL STRIP: NEGATIVE
RBC # BLD AUTO: 3.5 X10(6)/MCL (ref 4–5.1)
SODIUM SERPL-SCNC: 133 MMOL/L (ref 136–145)
SP GR UR STRIP.AUTO: 1.01 (ref 1–1.03)
UROBILINOGEN UR STRIP-ACNC: 0.2
WBC # BLD AUTO: 9.29 X10(3)/MCL (ref 4–11.5)

## 2025-04-15 PROCEDURE — 85025 COMPLETE CBC W/AUTO DIFF WBC: CPT | Performed by: OBSTETRICS & GYNECOLOGY

## 2025-04-15 PROCEDURE — 36415 COLL VENOUS BLD VENIPUNCTURE: CPT | Performed by: OBSTETRICS & GYNECOLOGY

## 2025-04-15 PROCEDURE — 81003 URINALYSIS AUTO W/O SCOPE: CPT | Performed by: OBSTETRICS & GYNECOLOGY

## 2025-04-15 PROCEDURE — 80053 COMPREHEN METABOLIC PANEL: CPT | Performed by: OBSTETRICS & GYNECOLOGY

## 2025-04-15 RX ORDER — SODIUM CHLORIDE, SODIUM LACTATE, POTASSIUM CHLORIDE, CALCIUM CHLORIDE 600; 310; 30; 20 MG/100ML; MG/100ML; MG/100ML; MG/100ML
INJECTION, SOLUTION INTRAVENOUS CONTINUOUS
Status: DISCONTINUED | OUTPATIENT
Start: 2025-04-15 | End: 2025-04-15 | Stop reason: HOSPADM

## 2025-04-15 RX ORDER — ONDANSETRON 4 MG/1
8 TABLET, ORALLY DISINTEGRATING ORAL EVERY 8 HOURS PRN
Status: DISCONTINUED | OUTPATIENT
Start: 2025-04-15 | End: 2025-04-15 | Stop reason: HOSPADM

## 2025-04-15 RX ORDER — ACETAMINOPHEN 500 MG
500 TABLET ORAL EVERY 6 HOURS PRN
Status: DISCONTINUED | OUTPATIENT
Start: 2025-04-15 | End: 2025-04-15 | Stop reason: HOSPADM

## 2025-04-15 NOTE — TELEPHONE ENCOUNTER
PT CALLED SAYING THAT SHE IS 33W PREGNANT AND SHE IS HAVING SOME CRAMPING AT THE TOP OF HER STOMACH. ITS BEEN CONSISTENT FOR ABOUT THE LAST HOUR AND FEELS SIMILAR TO PERIOD CRAMPS BUT SHE HAS NO OTHER SYMPTOMS WITH IT. SPOKE WITH ANETTE. ADVISED PT THAT SHE NEEDS TO GO TO THE LABOR UNIT IN [place] SO SHE CAN BE MONITORED. PT VERBALIZED UNDERSTANDING AND WILL HEAD TO THE HOSPITAL.

## 2025-04-15 NOTE — NURSING
1000- PT PRESENTED TO  FROM HOME WITH C/O UPPER ABDOMINAL PAIN THAT STARTED AROUND 0800 THIS MORNING. PT PLACED ON EFM/TOCO. UA COLLECTED. SVE DONE. CLOSED/THICK/HIGH. PT DENIES ANY BLEEDING OR LOF. ACTIVE FETAL MOVEMENT.     1030- DR FLOOD NOTIFIED. NEW ORDERS FOR CBC,CMP AND ABDOMINAL U/S TO RULE OUT GALLSTONES.     1110- NO GALL STONES NOTED. DR FLOOD NOTIFIED.     1220- OKAY TO DISCHARGE HOME WITH LABOR PRECAUTIONS. KEEP SCHEDULED APPT.

## 2025-04-21 ENCOUNTER — ROUTINE PRENATAL (OUTPATIENT)
Dept: OBSTETRICS AND GYNECOLOGY | Facility: CLINIC | Age: 20
End: 2025-04-21
Payer: COMMERCIAL

## 2025-04-21 VITALS — HEART RATE: 99 BPM | DIASTOLIC BLOOD PRESSURE: 60 MMHG | SYSTOLIC BLOOD PRESSURE: 100 MMHG | WEIGHT: 181.63 LBS

## 2025-04-21 DIAGNOSIS — Z34.83 ENCOUNTER FOR SUPERVISION OF OTHER NORMAL PREGNANCY IN THIRD TRIMESTER: Primary | ICD-10-CM

## 2025-04-21 DIAGNOSIS — Z86.19 HISTORY OF HERPES SIMPLEX INFECTION: ICD-10-CM

## 2025-04-21 DIAGNOSIS — Z3A.33 33 WEEKS GESTATION OF PREGNANCY: ICD-10-CM

## 2025-04-21 LAB
BILIRUB UR QL STRIP: NEGATIVE
GLUCOSE UR QL STRIP: NEGATIVE
KETONES UR QL STRIP: NEGATIVE
LEUKOCYTE ESTERASE UR QL STRIP: NEGATIVE
PH, POC UA: 7
POC BLOOD, URINE: NEGATIVE
POC NITRATES, URINE: NEGATIVE
PROT UR QL STRIP: NEGATIVE
SP GR UR STRIP: 1.02 (ref 1–1.03)
UROBILINOGEN UR STRIP-ACNC: 0.2 (ref 0.1–1.1)

## 2025-04-21 RX ORDER — VALACYCLOVIR HYDROCHLORIDE 500 MG/1
500 TABLET, FILM COATED ORAL DAILY
Qty: 30 TABLET | Refills: 3 | Status: SHIPPED | OUTPATIENT
Start: 2025-04-21 | End: 2025-04-22

## 2025-04-21 NOTE — PROGRESS NOTES
"HPI  Salma Gold is a 19 y.o.   33w6d here for Routine Prenatal Visit (PATIENT WENT TO L&D IN [place] ON 4/15 FOR RIGHT SIDED PAIN, DIAGNOSED WITH ROUND LIGAMENT PAIN. PATIENT STATES SHE HAD THE SAME PAIN AGAIN ON YESTERDAY, BUT DENIES TAKING ANY TYLENOL. OTHERWISE, NO PROBLEMS. )  Denies any VB, ROM, and PIH sxs. Reports active fetal movements.   Salma's allergies were reviewed and updated as appropriate.  Past Medical History:   Diagnosis Date    HSV (herpes simplex virus) anogenital infection      Family History   Problem Relation Name Age of Onset    Cancer Paternal Grandfather      Cancer Maternal Grandmother       Social History[1]  OB History    Para Term  AB Living   2 1 1   1   SAB IAB Ectopic Multiple Live Births       1      # Outcome Date GA Lbr Sergio/2nd Weight Sex Type Anes PTL Lv   2 Current            1 Term 21 39w0d  3.6 kg (7 lb 15 oz) M Vag-Spont EPI N NICK     Current Medications[2]    ROS:  A comprehensive review of symptoms was completed and negative except as noted above.    Physical Exam:    Chaperone present for exam.    /60   Pulse 99   Wt 82.4 kg (181 lb 9.6 oz)   LMP 2024     Gen: No distress  Abdomen: Gravid, non-tender  Pelvic:   Extremities: No edema    Fetal Heart Rate: 144  Movement: Present    Recent Results (from the past 24 hours)   POCT Urinalysis, Dipstick, Automated, W/O Scope    Collection Time: 25  2:54 PM   Result Value Ref Range    POC Blood, Urine Negative Negative    POC Bilirubin, Urine Negative Negative    POC Urobilinogen, Urine 0.2 0.1 - 1.1    POC Ketones, Urine Negative Negative    POC Protein, Urine Negative Negative    POC Nitrates, Urine Negative Negative    POC Glucose, Urine Negative Negative    pH, UA 7.0     POC Specific Gravity, Urine 1.020 1.003 - 1.029    POC Leukocytes, Urine Negative Negative     ABO/Rh: No results found for: "GROUPTRH", "ABORH", "ABSCREEN"    H&H:  Lab Results   Component Value " "Date/Time    HGB 9.8 (L) 04/15/2025 10:55 AM    HCT 30.2 (L) 04/15/2025 10:55 AM       Platelet:  Lab Results   Component Value Date/Time     04/15/2025 10:55 AM       Osulivan:   Lab Results   Component Value Date/Time    TNLG3HK 69 (L) 03/13/2025 10:01 AM       HIV: No results found for: "HIV", "[id number]", "HIVSARESULT", "CLDRQ6QARJBV", "HIVSAINTERP", "HIVRAPID", "HIV1X2"    RPR:  Lab Results   Component Value Date/Time    SYPHAB Nonreactive 03/13/2025 10:01 AM       Hepatitis B Surface Antigen:No results found for: "HEPBSAG"    Hepatitis C:No results found for: "HEPCAB"    Rubella Immune Status:No results found for: "RUBELLAIMMUN", "RUBABIGG", "RUBABIGGINDX", "RUBELLAIGG"    GBS:No results found for: "SREPBPCR", "STREPBCULT", "STREPONLY"     Last PAP Date: No result found    ASSESSMENT/PLAN:    1. Encounter for supervision of other normal pregnancy in third trimester  -     POCT Urinalysis, Dipstick, Automated, W/O Scope    2. 33 weeks gestation of pregnancy    3. History of herpes simplex infection  -     VALTREX 500 mg tablet; Take 1 tablet (500 mg total) by mouth once daily.  Dispense: 30 tablet; Refill: 3      Labor unit and pre-eclampsia precautions given.  Fetal kick count instructions given to patient.     Follow Up:  Follow up in about 2 weeks (around 5/5/2025) for OB VS.            [1]   Social History  Tobacco Use    Smoking status: Never    Smokeless tobacco: Never   Substance Use Topics    Alcohol use: Not Currently    Drug use: Never   [2]   Current Outpatient Medications:     ferrous sulfate 325 (65 FE) MG EC tablet, Take 1 tablet (325 mg total) by mouth 2 (two) times daily., Disp: 60 tablet, Rfl: 3    folic acid (FOLVITE) 1 MG tablet, Take 1 tablet (1 mg total) by mouth once daily., Disp: 30 tablet, Rfl: 5    ascorbic acid, vitamin C, (VITAMIN C) 500 MG tablet, Take 1 tablet (500 mg total) by mouth 2 (two) times daily. (Patient not taking: Reported on 4/21/2025), Disp: 60 tablet, Rfl: 3    " docusate sodium (COLACE) 100 MG capsule, Take 1 capsule (100 mg total) by mouth 2 (two) times daily. (Patient not taking: Reported on 4/21/2025), Disp: 60 capsule, Rfl: 3    VALTREX 500 mg tablet, Take 1 tablet (500 mg total) by mouth once daily., Disp: 30 tablet, Rfl: 3

## 2025-04-22 DIAGNOSIS — Z86.19 HISTORY OF HERPES SIMPLEX INFECTION: ICD-10-CM

## 2025-04-22 RX ORDER — VALACYCLOVIR HYDROCHLORIDE 500 MG/1
500 TABLET, FILM COATED ORAL DAILY
Qty: 30 TABLET | Refills: 3 | Status: SHIPPED | OUTPATIENT
Start: 2025-04-22 | End: 2025-08-20

## 2025-04-29 ENCOUNTER — PATIENT MESSAGE (OUTPATIENT)
Dept: OTHER | Facility: OTHER | Age: 20
End: 2025-04-29
Payer: COMMERCIAL

## 2025-05-05 ENCOUNTER — ROUTINE PRENATAL (OUTPATIENT)
Dept: OBSTETRICS AND GYNECOLOGY | Facility: CLINIC | Age: 20
End: 2025-05-05
Payer: COMMERCIAL

## 2025-05-05 VITALS — WEIGHT: 185 LBS | HEART RATE: 90 BPM | SYSTOLIC BLOOD PRESSURE: 110 MMHG | DIASTOLIC BLOOD PRESSURE: 60 MMHG

## 2025-05-05 DIAGNOSIS — Z86.19 HISTORY OF HERPES SIMPLEX INFECTION: ICD-10-CM

## 2025-05-05 DIAGNOSIS — Z3A.35 35 WEEKS GESTATION OF PREGNANCY: ICD-10-CM

## 2025-05-05 DIAGNOSIS — Z34.83 ENCOUNTER FOR SUPERVISION OF OTHER NORMAL PREGNANCY IN THIRD TRIMESTER: Primary | ICD-10-CM

## 2025-05-05 DIAGNOSIS — O99.013 ANEMIA DURING PREGNANCY IN THIRD TRIMESTER: ICD-10-CM

## 2025-05-05 NOTE — PROGRESS NOTES
"HPI  Salma Gold is a 19 y.o.   35w6d here for Routine Prenatal Visit (NO C/O'S.)    Denies any VB, ROM, and PIH sxs. Reports active fetal movements.     Salma's allergies were reviewed and updated as appropriate.    Past Medical History:   Diagnosis Date    HSV (herpes simplex virus) anogenital infection      Family History   Problem Relation Name Age of Onset    Cancer Paternal Grandfather      Cancer Maternal Grandmother       Social History[1]  OB History    Para Term  AB Living   2 1 1   1   SAB IAB Ectopic Multiple Live Births       1      # Outcome Date GA Lbr Sergio/2nd Weight Sex Type Anes PTL Lv   2 Current            1 Term 21 39w0d  3.6 kg (7 lb 15 oz) M Vag-Spont EPI N NICK     Current Medications[2]    ROS:  A comprehensive review of symptoms was completed and negative except as noted above.    Physical Exam:    Chaperone present for exam.    /60   Pulse 90   Wt 83.9 kg (185 lb)   LMP 2024     Gen: No distress  Abdomen: Gravid, non-tender  Pelvic:   Extremities: No edema    Fetal Heart Rate: 146  Movement: Present    Recent Results (from the past 24 hours)   POCT Urinalysis, Dipstick, Automated, W/O Scope    Collection Time: 25  2:45 PM   Result Value Ref Range    POC Blood, Urine Negative Negative    POC Bilirubin, Urine Negative Negative    POC Urobilinogen, Urine 0.2 0.1 - 1.1    POC Ketones, Urine Negative Negative    POC Protein, Urine Negative Negative    POC Nitrates, Urine Negative Negative    POC Glucose, Urine Negative Negative    pH, UA 7.0     POC Specific Gravity, Urine 1.020 1.003 - 1.029    POC Leukocytes, Urine Negative Negative     ABO/Rh: No results found for: "GROUPTRH", "ABORH", "ABSCREEN"    H&H:  Lab Results   Component Value Date/Time    HGB 9.8 (L) 04/15/2025 10:55 AM    HCT 30.2 (L) 04/15/2025 10:55 AM       Platelet:  Lab Results   Component Value Date/Time     04/15/2025 10:55 AM       Osulivan:   Lab Results " "  Component Value Date/Time    ZARM5TW 69 (L) 03/13/2025 10:01 AM       HIV: No results found for: "HIV", "BSH01VLTB", "HIVSARESULT", "[id number]", "HIVSAINTERP", "HIVRAPID", "HIV1X2"    RPR:  Lab Results   Component Value Date/Time    SYPHAB Nonreactive 03/13/2025 10:01 AM       Hepatitis B Surface Antigen:No results found for: "HEPBSAG"    Hepatitis C:No results found for: "HEPCAB"    Rubella Immune Status:No results found for: "RUBELLAIMMUN", "RUBABIGG", "RUBABIGGINDX", "RUBELLAIGG"    GBS:No results found for: "SREPBPCR", "STREPBCULT", "STREPONLY"     Last PAP Date: No result found    ASSESSMENT/PLAN:    1. Encounter for supervision of other normal pregnancy in third trimester  -     POCT Urinalysis, Dipstick, Automated, W/O Scope    2. 35 weeks gestation of pregnancy    3. History of herpes simplex infection    4. Anemia during pregnancy in third trimester    5. Excessive weight gain during pregnancy, antepartum      Labor unit and pre-eclampsia precautions given.  Fetal kick count instructions given to patient.     Follow Up:  Follow up in about 1 week (around 5/12/2025) for OB FOLLOW UP.             [1]   Social History  Tobacco Use    Smoking status: Never    Smokeless tobacco: Never   Substance Use Topics    Alcohol use: Not Currently    Drug use: Never   [2]   Current Outpatient Medications:     ferrous sulfate 325 (65 FE) MG EC tablet, Take 1 tablet (325 mg total) by mouth 2 (two) times daily., Disp: 60 tablet, Rfl: 3    folic acid (FOLVITE) 1 MG tablet, Take 1 tablet (1 mg total) by mouth once daily., Disp: 30 tablet, Rfl: 5    valACYclovir (VALTREX) 500 MG tablet, Take 1 tablet (500 mg total) by mouth once daily., Disp: 30 tablet, Rfl: 3    ascorbic acid, vitamin C, (VITAMIN C) 500 MG tablet, Take 1 tablet (500 mg total) by mouth 2 (two) times daily. (Patient not taking: Reported on 5/5/2025), Disp: 60 tablet, Rfl: 3    docusate sodium (COLACE) 100 MG capsule, Take 1 capsule (100 mg total) by mouth 2 " (two) times daily. (Patient not taking: Reported on 5/5/2025), Disp: 60 capsule, Rfl: 3

## 2025-05-08 NOTE — PROGRESS NOTES
"HPI  Salma Gold is a 19 y.o.   37w0d here for No chief complaint on file.    PT IS TAKING VALTREX FOR HSV SUPPRESSION. GBS COLLECTED TODAY.    Denies any VB, ROM, and PIH sxs. Reports active fetal movements.     Salma's allergies were reviewed and updated as appropriate.    Past Medical History:   Diagnosis Date    HSV (herpes simplex virus) anogenital infection      Family History   Problem Relation Name Age of Onset    Cancer Paternal Grandfather      Cancer Maternal Grandmother       Social History[1]  OB History    Para Term  AB Living   2 1 1   1   SAB IAB Ectopic Multiple Live Births       1      # Outcome Date GA Lbr Sergio/2nd Weight Sex Type Anes PTL Lv   2 Current            1 Term 21 39w0d  3.6 kg (7 lb 15 oz) M Vag-Spont EPI N NICK     Current Medications[2]    ROS:  A comprehensive review of symptoms was completed and negative except as noted above.    Physical Exam:    Chaperone present for exam.    LMP 2024     Gen: No distress  Abdomen: Gravid, non-tender  Pelvic: SEE BELOW  Extremities: No edema         No results found for this or any previous visit (from the past 24 hours).  ABO/Rh: No results found for: "GROUPTRH", "ABORH", "ABSCREEN"    H&H:  Lab Results   Component Value Date/Time    HGB 9.8 (L) 04/15/2025 10:55 AM    HCT 30.2 (L) 04/15/2025 10:55 AM       Platelet:  Lab Results   Component Value Date/Time     04/15/2025 10:55 AM       Osulivan:   Lab Results   Component Value Date/Time    YZQN0LR 69 (L) 2025 10:01 AM       HIV: No results found for: "HIV", "MRU99LECT", "HIVSARESULT", "[id number]", "HIVSAINTERP", "HIVRAPID", "HIV1X2"    RPR:  Lab Results   Component Value Date/Time    SYPHAB Nonreactive 2025 10:01 AM       Hepatitis B Surface Antigen:No results found for: "HEPBSAG"    Hepatitis C:No results found for: "HEPCAB"    Rubella Immune Status:No results found for: "RUBELLAIMMUN", "RUBABIGG", "RUBABIGGINDX", " ""RUBELLAIGG"    GBS:No results found for: "SREPBPCR", "STREPBCULT", "STREPONLY"     Last PAP Date: No result found    ASSESSMENT/PLAN:    1. Encounter for supervision of other normal pregnancy in third trimester    2. 36 weeks gestation of pregnancy    3. History of herpes simplex infection    4. Anemia during pregnancy in third trimester    5. Excessive weight gain during pregnancy, antepartum        Labor unit and pre-eclampsia precautions given.  Fetal kick count instructions given to patient.     Follow Up:  Follow up in about 1 week (around 5/20/2025) for OB VS.     This note was transcribed by Francesca Mariscal. There may be transcription errors as a result, however minimal. Effort has been made to ensure accuracy of transcription, but any obvious errors or omissions should be clarified with the author of the document.       I agree with the above documentation.            [1]   Social History  Tobacco Use    Smoking status: Never    Smokeless tobacco: Never   Substance Use Topics    Alcohol use: Not Currently    Drug use: Never   [2]   Current Outpatient Medications:     ascorbic acid, vitamin C, (VITAMIN C) 500 MG tablet, Take 1 tablet (500 mg total) by mouth 2 (two) times daily. (Patient not taking: Reported on 5/5/2025), Disp: 60 tablet, Rfl: 3    docusate sodium (COLACE) 100 MG capsule, Take 1 capsule (100 mg total) by mouth 2 (two) times daily. (Patient not taking: Reported on 5/5/2025), Disp: 60 capsule, Rfl: 3    ferrous sulfate 325 (65 FE) MG EC tablet, Take 1 tablet (325 mg total) by mouth 2 (two) times daily., Disp: 60 tablet, Rfl: 3    folic acid (FOLVITE) 1 MG tablet, Take 1 tablet (1 mg total) by mouth once daily., Disp: 30 tablet, Rfl: 5    valACYclovir (VALTREX) 500 MG tablet, Take 1 tablet (500 mg total) by mouth once daily., Disp: 30 tablet, Rfl: 3    "

## 2025-05-13 ENCOUNTER — ROUTINE PRENATAL (OUTPATIENT)
Dept: OBSTETRICS AND GYNECOLOGY | Facility: CLINIC | Age: 20
End: 2025-05-13
Payer: COMMERCIAL

## 2025-05-13 VITALS — WEIGHT: 187 LBS | HEART RATE: 90 BPM | SYSTOLIC BLOOD PRESSURE: 118 MMHG | DIASTOLIC BLOOD PRESSURE: 70 MMHG

## 2025-05-13 DIAGNOSIS — O26.00 EXCESSIVE WEIGHT GAIN DURING PREGNANCY, ANTEPARTUM: ICD-10-CM

## 2025-05-13 DIAGNOSIS — Z3A.36 36 WEEKS GESTATION OF PREGNANCY: ICD-10-CM

## 2025-05-13 DIAGNOSIS — Z34.83 ENCOUNTER FOR SUPERVISION OF OTHER NORMAL PREGNANCY IN THIRD TRIMESTER: Primary | ICD-10-CM

## 2025-05-13 DIAGNOSIS — Z86.19 HISTORY OF HERPES SIMPLEX INFECTION: ICD-10-CM

## 2025-05-13 DIAGNOSIS — O99.013 ANEMIA DURING PREGNANCY IN THIRD TRIMESTER: ICD-10-CM

## 2025-05-13 LAB
BILIRUB UR QL STRIP: NEGATIVE
GLUCOSE UR QL STRIP: NEGATIVE
KETONES UR QL STRIP: NEGATIVE
LEUKOCYTE ESTERASE UR QL STRIP: NEGATIVE
PH, POC UA: 7
POC BLOOD, URINE: NEGATIVE
POC NITRATES, URINE: NEGATIVE
PROT UR QL STRIP: NEGATIVE
SP GR UR STRIP: 1.02 (ref 1–1.03)
UROBILINOGEN UR STRIP-ACNC: 1 (ref 0.1–1.1)

## 2025-05-13 PROCEDURE — 87653 STREP B DNA AMP PROBE: CPT | Performed by: OBSTETRICS & GYNECOLOGY

## 2025-05-13 NOTE — PROGRESS NOTES
"HPI  Salma Gold is a 19 y.o.   37w0d here for routine prenatal visit, pre-admit for delivery, and GBS culture collection.   Delivery process for vaginal delivery and  section discussed with the patient.  All the risks, benefits, and alternatives to each were discussed.  Patient verbalized understanding after all questions were answered.  Consents were signed.  Patient instructed to call Ob pre-admit for appointment.      Denies any VB, ROM, and PIH sxs.    aSlma's allergies, medications, history, and problem list were updated as appropriate.    ROS:  A comprehensive review of symptoms was completed and negative except as noted above.    Physical Exam:  Chaperone present for exam.    /70   Pulse 90   Wt 84.8 kg (187 lb)   LMP 2024     Gen: No distress  Abdomen: Gravid, non-tender  Pelvic: GBS VAGINAL-RECTAL CULTURE COLLECTED.   Extremities: No edema    Fetal Heart Rate: 147  Movement: Present  Presentation: Vertex  Dilation: Fingertip  Effacement (%): 20  Station: -3    Recent Results (from the past 24 hours)   POCT Urinalysis, Dipstick, Automated, W/O Scope    Collection Time: 25  1:59 PM   Result Value Ref Range    POC Blood, Urine Negative Negative    POC Bilirubin, Urine Negative Negative    POC Urobilinogen, Urine 1.0 0.1 - 1.1    POC Ketones, Urine Negative Negative    POC Protein, Urine Negative Negative    POC Nitrates, Urine Negative Negative    POC Glucose, Urine Negative Negative    pH, UA 7.0     POC Specific Gravity, Urine 1.020 1.003 - 1.029    POC Leukocytes, Urine Negative Negative       ABO/Rh: No results found for: "GROUPTRH", "ABORH", "ABSCREEN"  H&H:  Lab Results   Component Value Date/Time    HGB 9.8 (L) 04/15/2025 10:55 AM    HCT 30.2 (L) 04/15/2025 10:55 AM     Platelet:  Lab Results   Component Value Date/Time     04/15/2025 10:55 AM     Osulivan:   Lab Results   Component Value Date/Time    EJGY0UW 69 (L) 2025 10:01 AM     RPR:  Lab " Results   Component Value Date/Time    SYPHAB Nonreactive 03/13/2025 10:01 AM       ASSESSMENT/PLAN:    1. Encounter for supervision of other normal pregnancy in third trimester  -     Cancel: POCT Urinalysis, Dipstick, Automated, W/O Scope; Future; Expected date: 05/13/2025  -     Strep Group B by PCR  -     POCT Urinalysis, Dipstick, Automated, W/O Scope    2. 36 weeks gestation of pregnancy    3. History of herpes simplex infection    4. Anemia during pregnancy in third trimester    5. Excessive weight gain during pregnancy, antepartum      Labor unit and pre-eclampsia precautions given.  Fetal kick count instructions given to patient.     Follow Up:  Follow up in about 1 week (around 5/20/2025) for OB VS.      This note was transcribed by Francesca Mariscal CMA. There may be transcription errors as a result, however minimal. Effort has been made to ensure accuracy of transcription, but any obvious errors or omissions should be clarified with the author of the document.       I agree with the above documentation.

## 2025-05-15 LAB — STREP B PCR (OHS): NOT DETECTED

## 2025-05-16 ENCOUNTER — RESULTS FOLLOW-UP (OUTPATIENT)
Dept: OBSTETRICS AND GYNECOLOGY | Facility: CLINIC | Age: 20
End: 2025-05-16

## 2025-05-16 ENCOUNTER — HOSPITAL ENCOUNTER (OUTPATIENT)
Dept: PREADMISSION TESTING | Facility: HOSPITAL | Age: 20
Discharge: HOME | End: 2025-05-16
Payer: COMMERCIAL

## 2025-05-16 VITALS — WEIGHT: 187 LBS | HEIGHT: 65 IN | BODY MASS INDEX: 31.16 KG/M2

## 2025-05-16 DIAGNOSIS — Z3A.37 37 WEEKS GESTATION OF PREGNANCY: Primary | ICD-10-CM

## 2025-05-16 LAB
BASOPHILS # BLD AUTO: 0.02 X10(3)/MCL (ref 0.01–0.08)
BASOPHILS NFR BLD AUTO: 0.2 % (ref 0.1–1.2)
EOSINOPHIL # BLD AUTO: 0.07 X10(3)/MCL (ref 0.04–0.36)
EOSINOPHIL NFR BLD AUTO: 0.8 % (ref 0.7–7)
ERYTHROCYTE [DISTWIDTH] IN BLOOD BY AUTOMATED COUNT: 15.9 % (ref 11–14.5)
HBV SURFACE AG SERPL QL IA: NEGATIVE
HBV SURFACE AG SERPL QL IA: NORMAL
HCT VFR BLD AUTO: 32.6 % (ref 36–48)
HGB BLD-MCNC: 10.7 G/DL (ref 11.8–16)
HIV 1+2 AB+HIV1 P24 AG SERPL QL IA: NONREACTIVE
IMM GRANULOCYTES # BLD AUTO: 0.12 X10(3)/MCL (ref 0–0.03)
IMM GRANULOCYTES NFR BLD AUTO: 1.4 % (ref 0–0.5)
LYMPHOCYTES # BLD AUTO: 1.48 X10(3)/MCL (ref 1.16–3.74)
LYMPHOCYTES NFR BLD AUTO: 16.8 % (ref 20–55)
MCH RBC QN AUTO: 28 PG (ref 27–34)
MCHC RBC AUTO-ENTMCNC: 32.8 G/DL (ref 31–37)
MCV RBC AUTO: 85.3 FL (ref 79–99)
MONOCYTES # BLD AUTO: 0.66 X10(3)/MCL (ref 0.24–0.36)
MONOCYTES NFR BLD AUTO: 7.5 % (ref 4.7–12.5)
NEUTROPHILS # BLD AUTO: 6.48 X10(3)/MCL (ref 1.56–6.13)
NEUTROPHILS NFR BLD AUTO: 73.3 % (ref 37–73)
NRBC BLD AUTO-RTO: 0 %
PLATELET # BLD AUTO: 228 X10(3)/MCL (ref 140–371)
PMV BLD AUTO: 9.3 FL (ref 9.4–12.4)
RBC # BLD AUTO: 3.82 X10(6)/MCL (ref 4–5.1)
T PALLIDUM AB SER QL: NONREACTIVE
WBC # BLD AUTO: 8.83 X10(3)/MCL (ref 4–11.5)

## 2025-05-16 PROCEDURE — 85025 COMPLETE CBC W/AUTO DIFF WBC: CPT | Performed by: OBSTETRICS & GYNECOLOGY

## 2025-05-16 PROCEDURE — 86780 TREPONEMA PALLIDUM: CPT | Performed by: OBSTETRICS & GYNECOLOGY

## 2025-05-16 PROCEDURE — 87389 HIV-1 AG W/HIV-1&-2 AB AG IA: CPT | Performed by: OBSTETRICS & GYNECOLOGY

## 2025-05-16 PROCEDURE — 87340 HEPATITIS B SURFACE AG IA: CPT | Performed by: OBSTETRICS & GYNECOLOGY

## 2025-05-16 NOTE — DISCHARGE INSTRUCTIONS
Things to Bring to the Hospital:    To help make your stay as comfortable as possible you should bring the following items when you come to the hospital to deliver your baby:    1.  TOOTHBRUSH  2.  TOOTHPASTE  3.  SHAMPOO/CONDITIONER   4.  DEODORANT  5.  HAIRBRUSH OR COMB  6.  UNDERGARMENTS  7.  SOAP OR BODY WASH  8.  SANITARY NAPKINS/OVERNIGHTS IF YOU PREFER TO USE YOUR OWN PRODUCTS.   9.  NURSING PADS FOR YOUR BRA AND A BREAST PUMP IF YOU OWN ONE.     Other items needed for mom and baby include:    1.  Bras (2-3) should be packed and should be ones that were worn during pregnancy or nursing bras for breastfeeding.   2.  Gowns, slippers, and a robe.  3.  One outfit for baby to go home in as well as a blanket.  The hospital will provide baby clothes, blankets, diapers, and wipes during hospital stay.    4.  Federally approved car seat is required for the infant to go home at discharge.      OB VISITATION POLICY    The OB Department is open to family for patient visitation.  Smoking is not allowed on the premises.  Visitors must check with staff before entering a patient's room if there is a sign posted on the door or in the burkett.   Visitors may be in the room with baby if they are free from infection or any signs and symptoms of illness.  All visitors must use hand  or soap and water before touching the baby.   No children under age 12 are allowed to sleep overnight or to be left unattended in room.   Our rooms only accommodate one overnight guest.  While in labor and delivery room, you will be allowed 2 support persons if you choose and one additional guest will be allowed at the discretion of the physician or nurse.   The 2-3 labor and delivery support people may be interchangeable at the discretion of the OB nurse in charge of your care.   Any other visitors will be directed to the waiting room or post-partum room until after delivery.   No one will be allowed to stay in the labor and delivery  [facility].  After delivery, visitors will not be limited unless a procedure is in progress or your conditions warrants it.    Videos and photographs are not allowed to be taken during the delivery process. They may be taken after the baby is born and declared in good condition by nursing staff or physician.   If you will be having a delivery by , you will be allowed (1) support person in operating room unless the procedure is an emergency or under general anesthesia. No video or photography is allowed until after the procedure is complete.  The support person present will be asked to leave the operating room with the baby after delivery or if any problems arise.   Family and friends should be made familiar with the visitor policy for our facility, so that we may insure that the safety and well-being of you and your baby during this very important time.

## 2025-05-19 NOTE — PROGRESS NOTES
HPI  Salma Gold is a 19 y.o.   38w0d here for Routine Prenatal Visit (NO C/O'S.)    Denies any VB, ROM, and PIH sxs. Reports active fetal movements.     Corie allergies were reviewed and updated as appropriate.    Past Medical History:   Diagnosis Date    Asthma     HSV (herpes simplex virus) anogenital infection      Family History   Problem Relation Name Age of Onset    Cancer Paternal Grandfather      Cancer Maternal Grandmother       Social History[1]  OB History    Para Term  AB Living   2 2 2   2   SAB IAB Ectopic Multiple Live Births      0 2      # Outcome Date GA Lbr Sergio/2nd Weight Sex Type Anes PTL Lv   2 Term 24 39w3d 02:00 / 00:14 3.28 kg (7 lb 3.7 oz) F Vag-Spont EPI N NICK      Complications: Anemia affecting pregnancy   1 Term 20 39w0d  3.6 kg (7 lb 15 oz) M Vag-Spont EPI N NICK     Current Medications[2]    ROS:  A comprehensive review of symptoms was completed and negative except as noted above.    Physical Exam:    Chaperone present for exam.    /60   Pulse 89   Wt 85.3 kg (188 lb)   LMP 2024   BMI 31.28 kg/m²     Gen: No distress  Abdomen: Gravid, non-tender  Pelvic: SEE BELOW   Extremities: No edema    Fetal Heart Rate: 145  Movement: Present  Dilation: Fingertip  Effacement (%): 20  Station: -3    No results found for this or any previous visit (from the past 24 hours).    GBS:  Lab Results   Component Value Date/Time    SREPBPCR Not Detected 2025 01:52 PM        Last PAP Date: No result found    ASSESSMENT/PLAN:    Patient educated on risks and complications of obesity and pregnancy including but not limited to first trimester miscarriage recurrent miscarriages, congenital anomalies, HTN disorders, gestational diabetes, macrosomia, PTL&D, higher risk of fetal demise in-utero and higher risk of CS (and wound complication including infection breakdown) with obesity.     1. Encounter for supervision of other normal pregnancy in third  trimester  -     POCT Urinalysis, Dipstick, Automated, W/O Scope    2. 38 weeks gestation of pregnancy    3. History of herpes simplex infection    4. Anemia during pregnancy in third trimester    5. Excessive weight gain during pregnancy, antepartum    Labor unit and pre-eclampsia precautions given.  Fetal kick count instructions given to patient.     Follow Up:  Follow up in about 1 week (around 5/27/2025) for OB .          [1]   Social History  Tobacco Use    Smoking status: Never    Smokeless tobacco: Never   Substance Use Topics    Alcohol use: Not Currently    Drug use: Never   [2]   Current Outpatient Medications:     ascorbic acid, vitamin C, (VITAMIN C) 500 MG tablet, Take 1 tablet (500 mg total) by mouth 2 (two) times daily., Disp: 60 tablet, Rfl: 3    docusate sodium (COLACE) 100 MG capsule, Take 1 capsule (100 mg total) by mouth 2 (two) times daily., Disp: 60 capsule, Rfl: 3    ferrous sulfate 325 (65 FE) MG EC tablet, Take 1 tablet (325 mg total) by mouth 2 (two) times daily., Disp: 60 tablet, Rfl: 3    folic acid (FOLVITE) 1 MG tablet, Take 1 tablet (1 mg total) by mouth once daily., Disp: 30 tablet, Rfl: 5    ibuprofen (ADVIL,MOTRIN) 600 MG tablet, Take 1 tablet (600 mg total) by mouth every 6 (six) hours as needed for Pain., Disp: 30 tablet, Rfl: 0

## 2025-05-20 ENCOUNTER — ROUTINE PRENATAL (OUTPATIENT)
Dept: OBSTETRICS AND GYNECOLOGY | Facility: CLINIC | Age: 20
End: 2025-05-20
Payer: COMMERCIAL

## 2025-05-20 ENCOUNTER — RESULTS FOLLOW-UP (OUTPATIENT)
Dept: OBSTETRICS AND GYNECOLOGY | Facility: CLINIC | Age: 20
End: 2025-05-20

## 2025-05-20 VITALS
SYSTOLIC BLOOD PRESSURE: 120 MMHG | BODY MASS INDEX: 31.28 KG/M2 | DIASTOLIC BLOOD PRESSURE: 60 MMHG | HEART RATE: 89 BPM | WEIGHT: 188 LBS

## 2025-05-20 DIAGNOSIS — Z3A.38 38 WEEKS GESTATION OF PREGNANCY: ICD-10-CM

## 2025-05-20 DIAGNOSIS — Z34.83 ENCOUNTER FOR SUPERVISION OF OTHER NORMAL PREGNANCY IN THIRD TRIMESTER: Primary | ICD-10-CM

## 2025-05-20 DIAGNOSIS — O26.00 EXCESSIVE WEIGHT GAIN DURING PREGNANCY, ANTEPARTUM: ICD-10-CM

## 2025-05-20 DIAGNOSIS — O99.013 ANEMIA DURING PREGNANCY IN THIRD TRIMESTER: ICD-10-CM

## 2025-05-20 DIAGNOSIS — Z86.19 HISTORY OF HERPES SIMPLEX INFECTION: ICD-10-CM

## 2025-05-20 LAB
BILIRUB UR QL STRIP: NEGATIVE
GLUCOSE UR QL STRIP: NEGATIVE
KETONES UR QL STRIP: NEGATIVE
LEUKOCYTE ESTERASE UR QL STRIP: POSITIVE
PH, POC UA: 7.5
POC BLOOD, URINE: NEGATIVE
POC NITRATES, URINE: NEGATIVE
PROT UR QL STRIP: NEGATIVE
SP GR UR STRIP: 1.01 (ref 1–1.03)
UROBILINOGEN UR STRIP-ACNC: 1 (ref 0.1–1.1)

## 2025-05-28 ENCOUNTER — ROUTINE PRENATAL (OUTPATIENT)
Dept: OBSTETRICS AND GYNECOLOGY | Facility: CLINIC | Age: 20
End: 2025-05-28
Payer: COMMERCIAL

## 2025-05-28 VITALS
WEIGHT: 192.5 LBS | BODY MASS INDEX: 32.03 KG/M2 | HEART RATE: 83 BPM | DIASTOLIC BLOOD PRESSURE: 62 MMHG | SYSTOLIC BLOOD PRESSURE: 118 MMHG

## 2025-05-28 DIAGNOSIS — O26.00 EXCESSIVE WEIGHT GAIN DURING PREGNANCY, ANTEPARTUM: ICD-10-CM

## 2025-05-28 DIAGNOSIS — Z86.19 HISTORY OF HERPES SIMPLEX INFECTION: ICD-10-CM

## 2025-05-28 DIAGNOSIS — O99.013 ANEMIA DURING PREGNANCY IN THIRD TRIMESTER: ICD-10-CM

## 2025-05-28 DIAGNOSIS — Z34.83 ENCOUNTER FOR SUPERVISION OF OTHER NORMAL PREGNANCY IN THIRD TRIMESTER: Primary | ICD-10-CM

## 2025-05-28 DIAGNOSIS — Z3A.39 39 WEEKS GESTATION OF PREGNANCY: ICD-10-CM

## 2025-05-28 NOTE — PROGRESS NOTES
HPI  Salma Gold is a 19 y.o.   39w1d here for Routine Prenatal Visit.  Complains of pelvic pressure, and irregular contractions. Patient denies timing the contractions, and states they are sporadic.  Patient would like to induced. Discussed risks, benefits, alternatives.     Denies any VB, ROM, and PIH sxs. Reports active fetal movements.     Salma's allergies were reviewed and updated as appropriate.    Past Medical History:   Diagnosis Date    Asthma     HSV (herpes simplex virus) anogenital infection      Family History   Problem Relation Name Age of Onset    Cancer Paternal Grandfather      Cancer Maternal Grandmother       Social History[1]  OB History    Para Term  AB Living   2 1 1   1   SAB IAB Ectopic Multiple Live Births       1      # Outcome Date GA Lbr Sergio/2nd Weight Sex Type Anes PTL Lv   2 Current            1 Term 20 39w0d  3.6 kg (7 lb 15 oz) M Vag-Spont EPI N NICK     Current Medications[2]    ROS:  A comprehensive review of symptoms was completed and negative except as noted above.    Physical Exam:    Chaperone present for exam.    /62   Pulse 83   Wt 87.3 kg (192 lb 8 oz)   LMP 2024   BMI 32.03 kg/m²     Gen: No distress  Abdomen: Gravid, non-tender  Pelvic: see below   Extremities: No edema    Fetal Heart Rate: 164  Movement: Present  Presentation: Vertex  Dilation: 2.5  Effacement (%): 40  Station: -3    Recent Results (from the past 24 hours)   POCT Urinalysis, Dipstick, Automated, W/O Scope    Collection Time: 25 10:55 AM   Result Value Ref Range    POC Blood, Urine Negative Negative    POC Bilirubin, Urine Negative Negative    POC Urobilinogen, Urine 0.2 0.1 - 1.1    POC Ketones, Urine Negative Negative    POC Protein, Urine Negative Negative    POC Nitrates, Urine Negative Negative    POC Glucose, Urine Negative Negative    pH, UA 7.0     POC Specific Gravity, Urine 1.025 1.003 - 1.029    POC Leukocytes, Urine Negative Negative      H&H:  Lab Results   Component Value Date/Time    HGB 10.7 (L) 05/16/2025 01:25 PM    HCT 32.6 (L) 05/16/2025 01:25 PM       Platelet:  Lab Results   Component Value Date/Time     05/16/2025 01:25 PM       [place]:   Lab Results   Component Value Date/Time    AIVM8TV 69 (L) 03/13/2025 10:01 AM       HIV:   Lab Results   Component Value Date/Time    HIV Nonreactive 05/16/2025 01:25 PM       RPR:  Lab Results   Component Value Date/Time    SYPHAB Nonreactive 05/16/2025 01:25 PM       Hepatitis B Surface Antigen:  Lab Results   Component Value Date/Time    HEPBSAG Negative 05/16/2025 01:25 PM     GBS:  Lab Results   Component Value Date/Time    SREPBPCR Not Detected 05/13/2025 01:52 PM        Last PAP Date: No result found    ASSESSMENT/PLAN:    1. Encounter for supervision of other normal pregnancy in third trimester  -     POCT Urinalysis, Dipstick, Automated, W/O Scope    2. 39 weeks gestation of pregnancy    3. History of herpes simplex infection    4. Anemia during pregnancy in third trimester    5. Excessive weight gain during pregnancy, antepartum    Labor unit and pre-eclampsia precautions given.  Fetal kick count instructions given to patient.   For elective labor induction this Friday, May 30th.   Will use pitocin.    Follow Up:  Follow up in about 1 week (around 6/4/2025) for OB VS.     This note was transcribed by Francesca Mariscal CMA. There may be transcription errors as a result, however minimal. Effort has been made to ensure accuracy of transcription, but any obvious errors or omissions should be clarified with the author of the document.       I agree with the above documentation.            [1]   Social History  Tobacco Use    Smoking status: Never    Smokeless tobacco: Never   Substance Use Topics    Alcohol use: Not Currently    Drug use: Never   [2]   Current Outpatient Medications:     ascorbic acid, vitamin C, (VITAMIN C) 500 MG tablet, Take 1 tablet (500 mg total) by mouth 2 (two) times  daily., Disp: 60 tablet, Rfl: 3    docusate sodium (COLACE) 100 MG capsule, Take 1 capsule (100 mg total) by mouth 2 (two) times daily., Disp: 60 capsule, Rfl: 3    ferrous sulfate 325 (65 FE) MG EC tablet, Take 1 tablet (325 mg total) by mouth 2 (two) times daily., Disp: 60 tablet, Rfl: 3    folic acid (FOLVITE) 1 MG tablet, Take 1 tablet (1 mg total) by mouth once daily., Disp: 30 tablet, Rfl: 5    valACYclovir (VALTREX) 500 MG tablet, Take 1 tablet (500 mg total) by mouth once daily., Disp: 30 tablet, Rfl: 3

## 2025-05-30 ENCOUNTER — HOSPITAL ENCOUNTER (INPATIENT)
Facility: HOSPITAL | Age: 20
LOS: 2 days | Discharge: HOME | End: 2025-06-01
Attending: OBSTETRICS & GYNECOLOGY | Admitting: OBSTETRICS & GYNECOLOGY
Payer: COMMERCIAL

## 2025-05-30 ENCOUNTER — ANESTHESIA EVENT (OUTPATIENT)
Dept: OBSTETRICS AND GYNECOLOGY | Facility: HOSPITAL | Age: 20
End: 2025-05-30
Payer: COMMERCIAL

## 2025-05-30 ENCOUNTER — ANESTHESIA (OUTPATIENT)
Dept: OBSTETRICS AND GYNECOLOGY | Facility: HOSPITAL | Age: 20
End: 2025-05-30
Payer: COMMERCIAL

## 2025-05-30 DIAGNOSIS — Z3A.39 39 WEEKS GESTATION OF PREGNANCY: ICD-10-CM

## 2025-05-30 DIAGNOSIS — Z34.90 PREGNANCY: Primary | ICD-10-CM

## 2025-05-30 DIAGNOSIS — Z34.90 ENCOUNTER FOR ELECTIVE INDUCTION OF LABOR: ICD-10-CM

## 2025-05-30 PROBLEM — Z86.19 HISTORY OF HERPES SIMPLEX INFECTION: Status: ACTIVE | Noted: 2025-05-30

## 2025-05-30 LAB
ABORH RETYPE: NORMAL
ALBUMIN SERPL-MCNC: 3.5 G/DL (ref 3.4–5)
ALBUMIN/GLOB SERPL: 1.1 RATIO
ALP SERPL-CCNC: 84 UNIT/L (ref 50–144)
ALT SERPL-CCNC: 16 UNIT/L (ref 1–45)
ANION GAP SERPL CALC-SCNC: 6 MEQ/L (ref 2–13)
AST SERPL-CCNC: 23 UNIT/L (ref 14–36)
BASOPHILS # BLD AUTO: 0.03 X10(3)/MCL (ref 0.01–0.08)
BASOPHILS NFR BLD AUTO: 0.3 % (ref 0.1–1.2)
BILIRUB SERPL-MCNC: 0.3 MG/DL (ref 0–1)
BUN SERPL-MCNC: 7 MG/DL (ref 7–20)
CALCIUM SERPL-MCNC: 9 MG/DL (ref 8.4–10.2)
CHLORIDE SERPL-SCNC: 109 MMOL/L (ref 98–110)
CO2 SERPL-SCNC: 18 MMOL/L (ref 21–32)
CREAT SERPL-MCNC: 0.54 MG/DL (ref 0.66–1.25)
CREAT/UREA NIT SERPL: 13 (ref 12–20)
EOSINOPHIL # BLD AUTO: 0.1 X10(3)/MCL (ref 0.04–0.36)
EOSINOPHIL NFR BLD AUTO: 1 % (ref 0.7–7)
ERYTHROCYTE [DISTWIDTH] IN BLOOD BY AUTOMATED COUNT: 16.3 % (ref 11–14.5)
GFR SERPLBLD CREATININE-BSD FMLA CKD-EPI: >90 ML/MIN/1.73/M2
GLOBULIN SER-MCNC: 3.1 GM/DL (ref 2–3.9)
GLUCOSE SERPL-MCNC: 116 MG/DL (ref 70–115)
GROUP & RH: NORMAL
HCT VFR BLD AUTO: 31 % (ref 36–48)
HGB BLD-MCNC: 10.2 G/DL (ref 11.8–16)
IMM GRANULOCYTES # BLD AUTO: 0.1 X10(3)/MCL (ref 0–0.03)
IMM GRANULOCYTES NFR BLD AUTO: 1 % (ref 0–0.5)
INDIRECT COOMBS: NORMAL
LYMPHOCYTES # BLD AUTO: 1.84 X10(3)/MCL (ref 1.16–3.74)
LYMPHOCYTES NFR BLD AUTO: 18.9 % (ref 20–55)
MCH RBC QN AUTO: 27.7 PG (ref 27–34)
MCHC RBC AUTO-ENTMCNC: 32.9 G/DL (ref 31–37)
MCV RBC AUTO: 84.2 FL (ref 79–99)
MONOCYTES # BLD AUTO: 0.66 X10(3)/MCL (ref 0.24–0.36)
MONOCYTES NFR BLD AUTO: 6.8 % (ref 4.7–12.5)
NEUTROPHILS # BLD AUTO: 7.02 X10(3)/MCL (ref 1.56–6.13)
NEUTROPHILS NFR BLD AUTO: 72 % (ref 37–73)
NRBC BLD AUTO-RTO: 0 %
PLATELET # BLD AUTO: 246 X10(3)/MCL (ref 140–371)
PMV BLD AUTO: 9.3 FL (ref 9.4–12.4)
POTASSIUM SERPL-SCNC: 3.4 MMOL/L (ref 3.5–5.1)
PROT SERPL-MCNC: 6.6 GM/DL (ref 6.3–8.2)
RBC # BLD AUTO: 3.68 X10(6)/MCL (ref 4–5.1)
SODIUM SERPL-SCNC: 133 MMOL/L (ref 136–145)
SPECIMEN OUTDATE: NORMAL
WBC # BLD AUTO: 9.75 X10(3)/MCL (ref 4–11.5)

## 2025-05-30 PROCEDURE — 59400 OBSTETRICAL CARE: CPT | Mod: GB,,, | Performed by: OBSTETRICS & GYNECOLOGY

## 2025-05-30 PROCEDURE — 3E033VJ INTRODUCTION OF OTHER HORMONE INTO PERIPHERAL VEIN, PERCUTANEOUS APPROACH: ICD-10-PCS | Performed by: OBSTETRICS & GYNECOLOGY

## 2025-05-30 PROCEDURE — 36415 COLL VENOUS BLD VENIPUNCTURE: CPT | Performed by: OBSTETRICS & GYNECOLOGY

## 2025-05-30 PROCEDURE — 0KQM0ZZ REPAIR PERINEUM MUSCLE, OPEN APPROACH: ICD-10-PCS | Performed by: OBSTETRICS & GYNECOLOGY

## 2025-05-30 PROCEDURE — 63600175 PHARM REV CODE 636 W HCPCS: Performed by: OBSTETRICS & GYNECOLOGY

## 2025-05-30 PROCEDURE — 10907ZC DRAINAGE OF AMNIOTIC FLUID, THERAPEUTIC FROM PRODUCTS OF CONCEPTION, VIA NATURAL OR ARTIFICIAL OPENING: ICD-10-PCS | Performed by: OBSTETRICS & GYNECOLOGY

## 2025-05-30 PROCEDURE — 80053 COMPREHEN METABOLIC PANEL: CPT | Performed by: OBSTETRICS & GYNECOLOGY

## 2025-05-30 PROCEDURE — 25000003 PHARM REV CODE 250: Performed by: OBSTETRICS & GYNECOLOGY

## 2025-05-30 PROCEDURE — 85025 COMPLETE CBC W/AUTO DIFF WBC: CPT | Performed by: OBSTETRICS & GYNECOLOGY

## 2025-05-30 PROCEDURE — 72200005 HC VAGINAL DELIVERY LEVEL II: Performed by: OBSTETRICS & GYNECOLOGY

## 2025-05-30 PROCEDURE — 62326 NJX INTERLAMINAR LMBR/SAC: CPT | Performed by: NURSE ANESTHETIST, CERTIFIED REGISTERED

## 2025-05-30 PROCEDURE — 11000001 HC ACUTE MED/SURG PRIVATE ROOM

## 2025-05-30 PROCEDURE — 72100002 HC LABOR CARE, 1ST 8 HOURS: Performed by: OBSTETRICS & GYNECOLOGY

## 2025-05-30 PROCEDURE — 86900 BLOOD TYPING SEROLOGIC ABO: CPT | Performed by: OBSTETRICS & GYNECOLOGY

## 2025-05-30 PROCEDURE — 3E0P7VZ INTRODUCTION OF HORMONE INTO FEMALE REPRODUCTIVE, VIA NATURAL OR ARTIFICIAL OPENING: ICD-10-PCS | Performed by: OBSTETRICS & GYNECOLOGY

## 2025-05-30 RX ORDER — ROPIVACAINE HYDROCHLORIDE 2 MG/ML
INJECTION, SOLUTION EPIDURAL; INFILTRATION; PERINEURAL
Status: COMPLETED
Start: 2025-05-30 | End: 2025-05-30

## 2025-05-30 RX ORDER — OXYTOCIN-SODIUM CHLORIDE 0.9% IV SOLN 30 UNIT/500ML 30-0.9/5 UT/ML-%
30 SOLUTION INTRAVENOUS ONCE AS NEEDED
Status: DISCONTINUED | OUTPATIENT
Start: 2025-05-30 | End: 2025-05-31

## 2025-05-30 RX ORDER — OXYTOCIN 10 [USP'U]/ML
10 INJECTION, SOLUTION INTRAMUSCULAR; INTRAVENOUS ONCE AS NEEDED
Status: DISCONTINUED | OUTPATIENT
Start: 2025-05-30 | End: 2025-05-30 | Stop reason: SDUPTHER

## 2025-05-30 RX ORDER — OXYTOCIN-SODIUM CHLORIDE 0.9% IV SOLN 30 UNIT/500ML 30-0.9/5 UT/ML-%
15 SOLUTION INTRAVENOUS ONCE AS NEEDED
Status: DISCONTINUED | OUTPATIENT
Start: 2025-05-30 | End: 2025-06-01 | Stop reason: HOSPADM

## 2025-05-30 RX ORDER — OXYTOCIN-SODIUM CHLORIDE 0.9% IV SOLN 30 UNIT/500ML 30-0.9/5 UT/ML-%
0-32 SOLUTION INTRAVENOUS CONTINUOUS
Status: DISCONTINUED | OUTPATIENT
Start: 2025-05-30 | End: 2025-05-31

## 2025-05-30 RX ORDER — TERBUTALINE SULFATE 1 MG/ML
0.25 INJECTION SUBCUTANEOUS
Status: DISCONTINUED | OUTPATIENT
Start: 2025-05-30 | End: 2025-05-31

## 2025-05-30 RX ORDER — SODIUM CHLORIDE 9 MG/ML
INJECTION, SOLUTION INTRAVENOUS
Status: DISCONTINUED | OUTPATIENT
Start: 2025-05-30 | End: 2025-05-31

## 2025-05-30 RX ORDER — METHYLERGONOVINE MALEATE 0.2 MG/ML
200 INJECTION INTRAVENOUS ONCE AS NEEDED
Status: DISCONTINUED | OUTPATIENT
Start: 2025-05-30 | End: 2025-06-01 | Stop reason: HOSPADM

## 2025-05-30 RX ORDER — CEFAZOLIN 2 G/1
2 INJECTION, POWDER, FOR SOLUTION INTRAMUSCULAR; INTRAVENOUS ONCE AS NEEDED
Status: DISCONTINUED | OUTPATIENT
Start: 2025-05-30 | End: 2025-05-31

## 2025-05-30 RX ORDER — CARBOPROST TROMETHAMINE 250 UG/ML
250 INJECTION, SOLUTION INTRAMUSCULAR
Status: DISCONTINUED | OUTPATIENT
Start: 2025-05-30 | End: 2025-05-30 | Stop reason: SDUPTHER

## 2025-05-30 RX ORDER — DEXMEDETOMIDINE HYDROCHLORIDE 100 UG/ML
5 INJECTION, SOLUTION INTRAVENOUS ONCE
Status: COMPLETED | OUTPATIENT
Start: 2025-05-30 | End: 2025-05-30

## 2025-05-30 RX ORDER — SODIUM CHLORIDE 0.9 % (FLUSH) 0.9 %
10 SYRINGE (ML) INJECTION
Status: DISCONTINUED | OUTPATIENT
Start: 2025-05-30 | End: 2025-06-01 | Stop reason: HOSPADM

## 2025-05-30 RX ORDER — LIDOCAINE HCL/EPINEPHRINE/PF 2%-1:200K
VIAL (ML) INJECTION
Status: COMPLETED
Start: 2025-05-30 | End: 2025-05-30

## 2025-05-30 RX ORDER — OXYTOCIN-SODIUM CHLORIDE 0.9% IV SOLN 30 UNIT/500ML 30-0.9/5 UT/ML-%
10 SOLUTION INTRAVENOUS ONCE
Status: DISCONTINUED | OUTPATIENT
Start: 2025-05-30 | End: 2025-05-31

## 2025-05-30 RX ORDER — DIPHENOXYLATE HYDROCHLORIDE AND ATROPINE SULFATE 2.5; .025 MG/1; MG/1
2 TABLET ORAL EVERY 6 HOURS PRN
Status: DISCONTINUED | OUTPATIENT
Start: 2025-05-30 | End: 2025-05-30 | Stop reason: SDUPTHER

## 2025-05-30 RX ORDER — ONDANSETRON 4 MG/1
8 TABLET, ORALLY DISINTEGRATING ORAL EVERY 8 HOURS PRN
Status: DISCONTINUED | OUTPATIENT
Start: 2025-05-30 | End: 2025-06-01 | Stop reason: HOSPADM

## 2025-05-30 RX ORDER — CALCIUM CARBONATE 200(500)MG
500 TABLET,CHEWABLE ORAL 3 TIMES DAILY PRN
Status: DISCONTINUED | OUTPATIENT
Start: 2025-05-30 | End: 2025-05-31

## 2025-05-30 RX ORDER — SIMETHICONE 80 MG
1 TABLET,CHEWABLE ORAL 4 TIMES DAILY PRN
Status: DISCONTINUED | OUTPATIENT
Start: 2025-05-30 | End: 2025-05-30 | Stop reason: SDUPTHER

## 2025-05-30 RX ORDER — OXYTOCIN-SODIUM CHLORIDE 0.9% IV SOLN 30 UNIT/500ML 30-0.9/5 UT/ML-%
95 SOLUTION INTRAVENOUS ONCE
Status: CANCELLED | OUTPATIENT
Start: 2025-05-30 | End: 2025-05-30

## 2025-05-30 RX ORDER — PROCHLORPERAZINE EDISYLATE 5 MG/ML
5 INJECTION INTRAMUSCULAR; INTRAVENOUS EVERY 6 HOURS PRN
Status: DISCONTINUED | OUTPATIENT
Start: 2025-05-30 | End: 2025-05-31

## 2025-05-30 RX ORDER — OXYTOCIN 10 [USP'U]/ML
10 INJECTION, SOLUTION INTRAMUSCULAR; INTRAVENOUS ONCE AS NEEDED
Status: DISCONTINUED | OUTPATIENT
Start: 2025-05-30 | End: 2025-06-01 | Stop reason: HOSPADM

## 2025-05-30 RX ORDER — OXYTOCIN-SODIUM CHLORIDE 0.9% IV SOLN 30 UNIT/500ML 30-0.9/5 UT/ML-%
95 SOLUTION INTRAVENOUS ONCE AS NEEDED
Status: DISCONTINUED | OUTPATIENT
Start: 2025-05-30 | End: 2025-05-31

## 2025-05-30 RX ORDER — BUTORPHANOL TARTRATE 2 MG/ML
2 INJECTION INTRAMUSCULAR; INTRAVENOUS
Status: CANCELLED | OUTPATIENT
Start: 2025-05-30

## 2025-05-30 RX ORDER — DOCUSATE SODIUM 100 MG/1
200 CAPSULE, LIQUID FILLED ORAL 2 TIMES DAILY PRN
Status: DISCONTINUED | OUTPATIENT
Start: 2025-05-30 | End: 2025-06-01 | Stop reason: HOSPADM

## 2025-05-30 RX ORDER — DIPHENHYDRAMINE HYDROCHLORIDE 50 MG/ML
25 INJECTION, SOLUTION INTRAMUSCULAR; INTRAVENOUS EVERY 4 HOURS PRN
Status: DISCONTINUED | OUTPATIENT
Start: 2025-05-30 | End: 2025-06-01 | Stop reason: HOSPADM

## 2025-05-30 RX ORDER — ROPIVACAINE HYDROCHLORIDE 2 MG/ML
12 INJECTION, SOLUTION EPIDURAL; INFILTRATION CONTINUOUS
Status: DISCONTINUED | OUTPATIENT
Start: 2025-05-30 | End: 2025-05-31

## 2025-05-30 RX ORDER — LIDOCAINE HYDROCHLORIDE AND EPINEPHRINE 15; 5 MG/ML; UG/ML
INJECTION, SOLUTION EPIDURAL
Status: DISCONTINUED | OUTPATIENT
Start: 2025-05-30 | End: 2025-05-30

## 2025-05-30 RX ORDER — TRANEXAMIC ACID 10 MG/ML
1000 INJECTION, SOLUTION INTRAVENOUS EVERY 30 MIN PRN
Status: DISCONTINUED | OUTPATIENT
Start: 2025-05-30 | End: 2025-05-30 | Stop reason: SDUPTHER

## 2025-05-30 RX ORDER — HYDROCORTISONE 25 MG/G
CREAM TOPICAL 3 TIMES DAILY PRN
Status: DISCONTINUED | OUTPATIENT
Start: 2025-05-30 | End: 2025-06-01 | Stop reason: HOSPADM

## 2025-05-30 RX ORDER — MISOPROSTOL 100 UG/1
800 TABLET ORAL ONCE AS NEEDED
Status: DISCONTINUED | OUTPATIENT
Start: 2025-05-30 | End: 2025-05-30 | Stop reason: SDUPTHER

## 2025-05-30 RX ORDER — METHYLERGONOVINE MALEATE 0.2 MG/ML
200 INJECTION INTRAVENOUS ONCE AS NEEDED
Status: DISCONTINUED | OUTPATIENT
Start: 2025-05-30 | End: 2025-05-30 | Stop reason: SDUPTHER

## 2025-05-30 RX ORDER — ACETAMINOPHEN 325 MG/1
650 TABLET ORAL EVERY 6 HOURS PRN
Status: DISCONTINUED | OUTPATIENT
Start: 2025-05-30 | End: 2025-05-31

## 2025-05-30 RX ORDER — TRANEXAMIC ACID 10 MG/ML
1000 INJECTION, SOLUTION INTRAVENOUS EVERY 30 MIN PRN
Status: DISCONTINUED | OUTPATIENT
Start: 2025-05-30 | End: 2025-06-01 | Stop reason: HOSPADM

## 2025-05-30 RX ORDER — SIMETHICONE 80 MG
1 TABLET,CHEWABLE ORAL EVERY 6 HOURS PRN
Status: DISCONTINUED | OUTPATIENT
Start: 2025-05-30 | End: 2025-06-01 | Stop reason: HOSPADM

## 2025-05-30 RX ORDER — MISOPROSTOL 100 MCG
25 TABLET ORAL EVERY 4 HOURS PRN
Status: DISCONTINUED | OUTPATIENT
Start: 2025-05-30 | End: 2025-05-31

## 2025-05-30 RX ORDER — BUTORPHANOL TARTRATE 2 MG/ML
1 INJECTION INTRAMUSCULAR; INTRAVENOUS
Status: CANCELLED | OUTPATIENT
Start: 2025-05-30

## 2025-05-30 RX ORDER — PRENATAL WITH FERROUS FUM AND FOLIC ACID 3080; 920; 120; 400; 22; 1.84; 3; 20; 10; 1; 12; 200; 27; 25; 2 [IU]/1; [IU]/1; MG/1; [IU]/1; MG/1; MG/1; MG/1; MG/1; MG/1; MG/1; UG/1; MG/1; MG/1; MG/1; MG/1
1 TABLET ORAL DAILY
Status: DISCONTINUED | OUTPATIENT
Start: 2025-05-31 | End: 2025-06-01 | Stop reason: HOSPADM

## 2025-05-30 RX ORDER — MISOPROSTOL 100 UG/1
800 TABLET ORAL ONCE AS NEEDED
Status: DISCONTINUED | OUTPATIENT
Start: 2025-05-30 | End: 2025-06-01 | Stop reason: HOSPADM

## 2025-05-30 RX ORDER — DIPHENOXYLATE HYDROCHLORIDE AND ATROPINE SULFATE 2.5; .025 MG/1; MG/1
2 TABLET ORAL EVERY 6 HOURS PRN
Status: DISCONTINUED | OUTPATIENT
Start: 2025-05-30 | End: 2025-06-01 | Stop reason: HOSPADM

## 2025-05-30 RX ORDER — ONDANSETRON 4 MG/1
8 TABLET, ORALLY DISINTEGRATING ORAL EVERY 8 HOURS PRN
Status: DISCONTINUED | OUTPATIENT
Start: 2025-05-30 | End: 2025-05-30 | Stop reason: SDUPTHER

## 2025-05-30 RX ORDER — HYDROCODONE BITARTRATE AND ACETAMINOPHEN 5; 325 MG/1; MG/1
1 TABLET ORAL EVERY 4 HOURS PRN
Status: DISCONTINUED | OUTPATIENT
Start: 2025-05-30 | End: 2025-06-01 | Stop reason: HOSPADM

## 2025-05-30 RX ORDER — IBUPROFEN 600 MG/1
600 TABLET, FILM COATED ORAL EVERY 6 HOURS PRN
Status: DISCONTINUED | OUTPATIENT
Start: 2025-05-30 | End: 2025-06-01 | Stop reason: HOSPADM

## 2025-05-30 RX ORDER — DIPHENHYDRAMINE HCL 25 MG
25 CAPSULE ORAL EVERY 4 HOURS PRN
Status: DISCONTINUED | OUTPATIENT
Start: 2025-05-30 | End: 2025-06-01 | Stop reason: HOSPADM

## 2025-05-30 RX ORDER — OXYTOCIN-SODIUM CHLORIDE 0.9% IV SOLN 30 UNIT/500ML 30-0.9/5 UT/ML-%
95 SOLUTION INTRAVENOUS ONCE AS NEEDED
Status: DISCONTINUED | OUTPATIENT
Start: 2025-05-30 | End: 2025-06-01 | Stop reason: HOSPADM

## 2025-05-30 RX ORDER — OXYTOCIN-SODIUM CHLORIDE 0.9% IV SOLN 30 UNIT/500ML 30-0.9/5 UT/ML-%
95 SOLUTION INTRAVENOUS CONTINUOUS PRN
Status: DISCONTINUED | OUTPATIENT
Start: 2025-05-30 | End: 2025-06-01 | Stop reason: HOSPADM

## 2025-05-30 RX ORDER — SODIUM CHLORIDE, SODIUM LACTATE, POTASSIUM CHLORIDE, CALCIUM CHLORIDE 600; 310; 30; 20 MG/100ML; MG/100ML; MG/100ML; MG/100ML
INJECTION, SOLUTION INTRAVENOUS CONTINUOUS
Status: DISCONTINUED | OUTPATIENT
Start: 2025-05-30 | End: 2025-05-31

## 2025-05-30 RX ORDER — MUPIROCIN 20 MG/G
OINTMENT TOPICAL
Status: CANCELLED | OUTPATIENT
Start: 2025-05-30

## 2025-05-30 RX ORDER — LIDOCAINE HCL/EPINEPHRINE/PF 2%-1:200K
1 VIAL (ML) INJECTION ONCE
Status: COMPLETED | OUTPATIENT
Start: 2025-05-30 | End: 2025-05-30

## 2025-05-30 RX ORDER — CARBOPROST TROMETHAMINE 250 UG/ML
250 INJECTION, SOLUTION INTRAMUSCULAR
Status: DISCONTINUED | OUTPATIENT
Start: 2025-05-30 | End: 2025-06-01 | Stop reason: HOSPADM

## 2025-05-30 RX ORDER — DEXMEDETOMIDINE HYDROCHLORIDE 100 UG/ML
INJECTION, SOLUTION INTRAVENOUS
Status: COMPLETED
Start: 2025-05-30 | End: 2025-05-30

## 2025-05-30 RX ORDER — HYDROCODONE BITARTRATE AND ACETAMINOPHEN 10; 325 MG/1; MG/1
1 TABLET ORAL EVERY 4 HOURS PRN
Status: DISCONTINUED | OUTPATIENT
Start: 2025-05-30 | End: 2025-06-01 | Stop reason: HOSPADM

## 2025-05-30 RX ORDER — LIDOCAINE HYDROCHLORIDE 10 MG/ML
10 INJECTION, SOLUTION INFILTRATION; PERINEURAL ONCE AS NEEDED
Status: CANCELLED | OUTPATIENT
Start: 2025-05-30 | End: 2036-10-25

## 2025-05-30 RX ORDER — PANTOPRAZOLE SODIUM 40 MG/1
40 TABLET, DELAYED RELEASE ORAL ONCE
Status: COMPLETED | OUTPATIENT
Start: 2025-05-30 | End: 2025-05-30

## 2025-05-30 RX ADMIN — LIDOCAINE HYDROCHLORIDE AND EPINEPHRINE 3 ML: 20; 5 INJECTION, SOLUTION EPIDURAL; INFILTRATION; INTRACAUDAL; PERINEURAL at 11:05

## 2025-05-30 RX ADMIN — Medication 2 MILLI-UNITS/MIN: at 11:05

## 2025-05-30 RX ADMIN — SODIUM CHLORIDE, POTASSIUM CHLORIDE, SODIUM LACTATE AND CALCIUM CHLORIDE: 600; 310; 30; 20 INJECTION, SOLUTION INTRAVENOUS at 11:05

## 2025-05-30 RX ADMIN — PANTOPRAZOLE SODIUM 40 MG: 40 TABLET, DELAYED RELEASE ORAL at 05:05

## 2025-05-30 RX ADMIN — SODIUM CHLORIDE, POTASSIUM CHLORIDE, SODIUM LACTATE AND CALCIUM CHLORIDE: 600; 310; 30; 20 INJECTION, SOLUTION INTRAVENOUS at 08:05

## 2025-05-30 RX ADMIN — SODIUM CHLORIDE, POTASSIUM CHLORIDE, SODIUM LACTATE AND CALCIUM CHLORIDE: 600; 310; 30; 20 INJECTION, SOLUTION INTRAVENOUS at 01:05

## 2025-05-30 RX ADMIN — LIDOCAINE HYDROCHLORIDE AND EPINEPHRINE 2 ML: 15; 5 INJECTION, SOLUTION EPIDURAL at 11:05

## 2025-05-30 RX ADMIN — ROPIVACAINE HYDROCHLORIDE 12 ML/HR: 2 INJECTION, SOLUTION EPIDURAL; INFILTRATION at 11:05

## 2025-05-30 RX ADMIN — BENZOCAINE AND LEVOMENTHOL: 200; 5 SPRAY TOPICAL at 10:05

## 2025-05-30 RX ADMIN — DEXMEDETOMIDINE 5 MCG: 200 INJECTION, SOLUTION INTRAVENOUS at 11:05

## 2025-05-30 RX ADMIN — MISOPROSTOL 25 MCG: 100 TABLET ORAL at 06:05

## 2025-05-30 RX ADMIN — IBUPROFEN 600 MG: 600 TABLET ORAL at 10:05

## 2025-05-30 RX ADMIN — LIDOCAINE HYDROCHLORIDE AND EPINEPHRINE 3 ML: 15; 5 INJECTION, SOLUTION EPIDURAL at 11:05

## 2025-05-30 NOTE — ANESTHESIA POSTPROCEDURE EVALUATION
Anesthesia Post Evaluation    Patient: Salma Gold    Procedure(s) Performed: * No procedures listed *    Final Anesthesia Type: epidural      Patient location during evaluation: labor & delivery  Patient participation: Yes- Able to Participate  Level of consciousness: awake and alert  Post-procedure vital signs: reviewed and stable  Pain management: adequate  Airway patency: patent  BEENA mitigation strategies: Multimodal analgesia  PONV status at discharge: No PONV  Anesthetic complications: no      Cardiovascular status: blood pressure returned to baseline  Respiratory status: unassisted and room air  Hydration status: euvolemic  Follow-up not needed.              Vitals Value Taken Time   /65 05/30/25 17:21   Temp 36.7 °C (98.1 °F) 05/30/25 08:23   Pulse 84 05/30/25 17:35   Resp 18 05/30/25 08:23   SpO2 100 % 05/30/25 17:35   Vitals shown include unfiled device data.      No case tracking events are documented in the log.      Pain/Kamar Score: No data recorded

## 2025-05-30 NOTE — ANESTHESIA PROCEDURE NOTES
Epidural    Patient location during procedure: OB   Reason for block: primary anesthetic   Reason for block: at surgeon's request, post-op pain management  Diagnosis: Active Labor   Start time: 5/30/2025 10:58 AM  Timeout: 5/30/2025 10:55 AM  End time: 5/30/2025 11:05 AM    Staffing  Performing Provider: Dennis Clifton CRNA  Authorizing Provider: Dennis Clifton CRNA    Staffing  Performed by: Dennis Clifton CRNA  Authorized by: Dennis Clifton CRNA        Preanesthetic Checklist  Completed: patient identified, IV checked, site marked, risks and benefits discussed, surgical consent, monitors and equipment checked, pre-op evaluation, timeout performed, anesthesia consent given, hand hygiene performed and patient being monitored  Preparation  Patient position: sitting  Prep: ChloraPrep  Patient monitoring: Pulse Ox and Blood Pressure  Reason for block: primary anesthetic   Epidural  Skin Anesthetic: lidocaine 1%  Administration type: single shot  Approach: midline  Interspace: L4-5    Injection technique: HANNAH air  Block type: caudal.  Needle and Epidural Catheter  Needle type: Tuohy   Needle gauge: 18  Needle length: 5.0 inches  Needle insertion depth: 7 cm  Catheter type: multi-orifice  Catheter size: 19 G  Catheter at skin depth: 12 cm  Insertion Attempts: 1  Test dose: 3 mL of lidocaine 1.5% with Epi 1-to-200,000  Additional Documentation: incremental injection, negative aspiration for heme and CSF and no paresthesia on injection  Needle localization: anatomical landmarks  Assessment  Ease of block: easy  Patient's tolerance of the procedure: comfortable throughout block No inadvertent dural puncture with Tuohy.  Dural puncture not performed with spinal needle

## 2025-05-30 NOTE — ANESTHESIA PREPROCEDURE EVALUATION
05/30/2025  Salma Gold is a 19 y.o., female.    HSV (herpes simplex virus) anogenital infection Asthma     Surgical History     TUBE IN EARS WISDOM TOOTH EXTRACTION   TONSILLECTOMY, ADENOIDECTOMY      Substance History     Smoking Status: Never   Smokeless Tobacco Status: Never   Alcohol use: Not Currently   Drug use: Never     Problem List   Current as of 05/30/25 1043  39 weeks gestation of pregnancy   Encounter for elective induction of labor   History of herpes simplex infection     Pre-op Assessment    I have reviewed the Patient Summary Reports.     I have reviewed the Nursing Notes. I have reviewed the NPO Status.   I have reviewed the Medications.     Review of Systems  Anesthesia Hx:  No problems with previous Anesthesia             Denies Family Hx of Anesthesia complications.    Denies Personal Hx of Anesthesia complications.                    Social:  Non-Smoker       Hematology/Oncology:    Oncology Normal    -- Anemia:                                  EENT/Dental:  EENT/Dental Normal           Cardiovascular:  Cardiovascular Normal Exercise tolerance: good                                             Pulmonary:    Asthma mild                   Renal/:  Renal/ Normal                 Hepatic/GI:  Hepatic/GI Normal                    Musculoskeletal:  Musculoskeletal Normal                Neurological:  Neurology Normal                                      Endocrine:  Endocrine Normal          Obesity / BMI > 30  Dermatological:  Skin Normal    Psych:  Psychiatric Normal                    Physical Exam  General: Well nourished, Cooperative, Alert and Oriented    Airway:  Mallampati: II / II  Mouth Opening: Normal  TM Distance: Normal  Tongue: Normal  Neck ROM: Normal ROM    Dental:  Intact        Anesthesia Plan  Type of Anesthesia, risks & benefits discussed:    Anesthesia Type:  CSE  Intra-op Monitoring Plan: Standard ASA Monitors  Post Op Pain Control Plan: multimodal analgesia  Induction:  IV  Airway Plan: Direct  Informed Consent: Informed consent signed with the Patient and all parties understand the risks and agree with anesthesia plan.  All questions answered. Patient consented to blood products? Yes  ASA Score: 2  Day of Surgery Review of History & Physical: H&P Update referred to the surgeon/provider.I have interviewed and examined the patient. I have reviewed the patient's H&P dated: There are no significant changes.     Ready For Surgery From Anesthesia Perspective.     .

## 2025-05-31 LAB
BASOPHILS # BLD AUTO: 0.03 X10(3)/MCL (ref 0.01–0.08)
BASOPHILS NFR BLD AUTO: 0.3 % (ref 0.1–1.2)
EOSINOPHIL # BLD AUTO: 0.07 X10(3)/MCL (ref 0.04–0.36)
EOSINOPHIL NFR BLD AUTO: 0.6 % (ref 0.7–7)
ERYTHROCYTE [DISTWIDTH] IN BLOOD BY AUTOMATED COUNT: 16.2 % (ref 11–14.5)
HCT VFR BLD AUTO: 32.7 % (ref 36–48)
HGB BLD-MCNC: 10.7 G/DL (ref 11.8–16)
IMM GRANULOCYTES # BLD AUTO: 0.09 X10(3)/MCL (ref 0–0.03)
IMM GRANULOCYTES NFR BLD AUTO: 0.8 % (ref 0–0.5)
LYMPHOCYTES # BLD AUTO: 2.29 X10(3)/MCL (ref 1.16–3.74)
LYMPHOCYTES NFR BLD AUTO: 19.9 % (ref 20–55)
MCH RBC QN AUTO: 27.6 PG (ref 27–34)
MCHC RBC AUTO-ENTMCNC: 32.7 G/DL (ref 31–37)
MCV RBC AUTO: 84.5 FL (ref 79–99)
MONOCYTES # BLD AUTO: 0.87 X10(3)/MCL (ref 0.24–0.36)
MONOCYTES NFR BLD AUTO: 7.6 % (ref 4.7–12.5)
NEUTROPHILS # BLD AUTO: 8.17 X10(3)/MCL (ref 1.56–6.13)
NEUTROPHILS NFR BLD AUTO: 70.8 % (ref 37–73)
NRBC BLD AUTO-RTO: 0 %
PLATELET # BLD AUTO: 218 X10(3)/MCL (ref 140–371)
PMV BLD AUTO: 9.3 FL (ref 9.4–12.4)
RBC # BLD AUTO: 3.87 X10(6)/MCL (ref 4–5.1)
WBC # BLD AUTO: 11.52 X10(3)/MCL (ref 4–11.5)

## 2025-05-31 PROCEDURE — 85025 COMPLETE CBC W/AUTO DIFF WBC: CPT | Performed by: OBSTETRICS & GYNECOLOGY

## 2025-05-31 PROCEDURE — 36415 COLL VENOUS BLD VENIPUNCTURE: CPT | Performed by: OBSTETRICS & GYNECOLOGY

## 2025-05-31 PROCEDURE — 25000003 PHARM REV CODE 250: Performed by: OBSTETRICS & GYNECOLOGY

## 2025-05-31 PROCEDURE — 11000001 HC ACUTE MED/SURG PRIVATE ROOM

## 2025-05-31 RX ORDER — IBUPROFEN 600 MG/1
600 TABLET, FILM COATED ORAL EVERY 6 HOURS PRN
Qty: 30 TABLET | Refills: 0 | Status: SHIPPED | OUTPATIENT
Start: 2025-05-31 | End: 2025-06-30

## 2025-05-31 RX ADMIN — HYDROCODONE BITARTRATE AND ACETAMINOPHEN 1 TABLET: 5; 325 TABLET ORAL at 04:05

## 2025-05-31 RX ADMIN — PRENATAL VITAMINS-IRON FUMARATE 27 MG IRON-FOLIC ACID 0.8 MG TABLET 1 TABLET: at 08:05

## 2025-05-31 RX ADMIN — IBUPROFEN 600 MG: 600 TABLET ORAL at 01:05

## 2025-06-01 VITALS
BODY MASS INDEX: 32.07 KG/M2 | RESPIRATION RATE: 16 BRPM | HEIGHT: 65 IN | TEMPERATURE: 98 F | SYSTOLIC BLOOD PRESSURE: 92 MMHG | HEART RATE: 79 BPM | WEIGHT: 192.5 LBS | OXYGEN SATURATION: 98 % | DIASTOLIC BLOOD PRESSURE: 51 MMHG

## 2025-06-01 PROCEDURE — 25000003 PHARM REV CODE 250: Performed by: OBSTETRICS & GYNECOLOGY

## 2025-06-01 RX ADMIN — DOCUSATE SODIUM 200 MG: 100 CAPSULE, LIQUID FILLED ORAL at 08:06

## 2025-06-01 RX ADMIN — HYDROCORTISONE: 25 CREAM TOPICAL at 08:06

## 2025-06-01 RX ADMIN — PRENATAL VITAMINS-IRON FUMARATE 27 MG IRON-FOLIC ACID 0.8 MG TABLET 1 TABLET: at 08:06

## 2025-06-01 RX ADMIN — IBUPROFEN 600 MG: 600 TABLET ORAL at 08:06

## 2025-06-26 NOTE — PROGRESS NOTES
Subjective:      Salma Gold is a 19 y.o.  who presents for a postpartum visit.    She is status post  VAGINAL DELIVERY 4 weeks ago.    Her hospitalization was not complicated.    She is breastfeeding.    She desires no method for contraception.    She denies signs and symptoms of postpartum depression.     Her last pap was on No result found.   Postpartum Care (NO C/O'S.)    Past Medical History:   Diagnosis Date    Asthma     HSV (herpes simplex virus) anogenital infection      Past Surgical History:   Procedure Laterality Date    TONSILLECTOMY, ADENOIDECTOMY      TUBE IN EARS      WISDOM TOOTH EXTRACTION       Social History[1]  Family History   Problem Relation Name Age of Onset    Cancer Paternal Grandfather      Cancer Maternal Grandmother       OB History    Para Term  AB Living   2 2 2   2   SAB IAB Ectopic Multiple Live Births      0 2      # Outcome Date GA Lbr Sergio/2nd Weight Sex Type Anes PTL Lv   2 Term 24 39w3d 02:00 / 00:14 3.28 kg (7 lb 3.7 oz) F Vag-Spont EPI N NICK      Complications: Anemia affecting pregnancy   1 Term 20 39w0d  3.6 kg (7 lb 15 oz) M Vag-Spont EPI N NICK     Current Medications[2]   Review the Delivery Report for details.     ROS:   Review of Systems  A comprehensive review of symptoms was completed and negative except as noted above.    Objective:     /60   Pulse 98   Wt 80.3 kg (177 lb)   LMP 2024   Breastfeeding Yes   BMI 29.45 kg/m²   Constitutional:  General Appearance : alert, in no acute distress, normal, well nourished.  Breast:  Right: Inspection/palpation: no discharge, no masses present, no nipple retraction, no skin changes, no skin dimpling, no tenderness, no lymphadenopathy, no axillary mass, no axillary tenderness.  Left: Inspection/palpation: no discharge, no masses present, no nipple retraction, no skin changes, no skin dimpling, no tenderness, no lymphadenopathy, no axillary mass, no axillary  tenderness.  Abdomen:  Genitourinary:  External Genitalia: normal, no lesions.  Vagina: normal appearance, no abnormal discharge, no lesions.  Bladder: no mass, nontender.  Urethra: no erythema or lesions present.  Cervix: no lesions, non tender.   Uterus: nontender, normal contour, normal mobility, normal size.   Adnexa: no masses, no tenderness.  Anus and Perineum: visually normal.   Chaperone Present  Assessment:     Postpartum follow-up        Plan:      Follow up in about 5 months (around 12/4/2025) for ANNUAL. In addition to their scheduled follow up, the patient has also been instructed to follow up on as needed basis.     This note was transcribed by Francesca Mariscal CMA. There may be transcription errors as a result, however minimal. Effort has been made to ensure accuracy of transcription, but any obvious errors or omissions should be clarified with the author of the document.     I agree with the above documentation.            [1]   Social History  Tobacco Use    Smoking status: Never    Smokeless tobacco: Never   Substance Use Topics    Alcohol use: Not Currently    Drug use: Never   [2]   Current Outpatient Medications:     ascorbic acid, vitamin C, (VITAMIN C) 500 MG tablet, Take 1 tablet (500 mg total) by mouth 2 (two) times daily. (Patient not taking: Reported on 7/2/2025), Disp: 60 tablet, Rfl: 3    docusate sodium (COLACE) 100 MG capsule, Take 1 capsule (100 mg total) by mouth 2 (two) times daily. (Patient not taking: Reported on 7/2/2025), Disp: 60 capsule, Rfl: 3    ferrous sulfate 325 (65 FE) MG EC tablet, Take 1 tablet (325 mg total) by mouth 2 (two) times daily. (Patient not taking: Reported on 7/2/2025), Disp: 60 tablet, Rfl: 3    folic acid (FOLVITE) 1 MG tablet, Take 1 tablet (1 mg total) by mouth once daily. (Patient not taking: Reported on 7/2/2025), Disp: 30 tablet, Rfl: 5

## 2025-07-02 ENCOUNTER — POSTPARTUM VISIT (OUTPATIENT)
Dept: OBSTETRICS AND GYNECOLOGY | Facility: CLINIC | Age: 20
End: 2025-07-02
Payer: COMMERCIAL

## 2025-07-02 VITALS
WEIGHT: 177 LBS | DIASTOLIC BLOOD PRESSURE: 60 MMHG | BODY MASS INDEX: 29.45 KG/M2 | HEART RATE: 98 BPM | SYSTOLIC BLOOD PRESSURE: 102 MMHG

## 2025-07-14 ENCOUNTER — OFFICE VISIT (OUTPATIENT)
Dept: OBSTETRICS AND GYNECOLOGY | Facility: CLINIC | Age: 20
End: 2025-07-14
Payer: COMMERCIAL

## 2025-07-14 VITALS
HEART RATE: 69 BPM | WEIGHT: 175 LBS | SYSTOLIC BLOOD PRESSURE: 112 MMHG | DIASTOLIC BLOOD PRESSURE: 80 MMHG | BODY MASS INDEX: 29.12 KG/M2

## 2025-07-14 NOTE — PROGRESS NOTES
Patient ID: [id number]   Chief Complaint: Follow-up vaginal laceration      HPI:   Salma Gold is a 19 y.o.  here today for Follow-up (Patient presents for 1 week follow up following incision check to vagina. Patient denies pain, but complains of some spotting while wiping after urination)  Denies fever, has not had sex yet.         No LMP recorded.    Past Medical History:  has a past medical history of Asthma and HSV (herpes simplex virus) anogenital infection.    Surgical History:  has a past surgical history that includes TUBE IN EARS; [place] tooth extraction; and TONSILLECTOMY, ADENOIDECTOMY.    Family History: family history includes Cancer in her maternal grandmother and paternal grandfather.    Social History:  reports that she has never smoked. She has never used smokeless tobacco. She reports that she does not currently use alcohol. She reports that she does not use drugs.    Current Medications[1]    Patient has no known allergies.    No results found for this or any previous visit (from the past 24 hours).    Subjective:   Review of Systems    12 point review of systems conducted, negative except as stated in the history of present illness.   See HPI for details.    Objective:     Visit Vitals  /70   Pulse 83   Wt 78.5 kg (173 lb)   Breastfeeding Yes   BMI 28.79 kg/m²       Physical Exam    Constitutional:  General Appearance : alert, in no acute distress, normal, well nourished.    Neck/Thyroid:  Inspection/Palpation: normal.    Respiratory:  Respiratory Effort: normal.    Gastrointestinal:  Abdomen: no masses. no tender, nondistended.  Liver and spleen: normal  Hernias: no hernias present, no inguinal adenopathy.    Genitourinary:  External Genitalia: normal, no lesions.  Vagina: normal appearance, no abnormal discharge, no lesions. Vaginal laceration healing well.  Bladder: no mass, nontender.  Urethra: no erythema or lesions present.  Cervix: no lesions, non tender.   Uterus:  nontender, normal contour, normal mobility, normal size.   Adnexa: no masses, no tenderness.  Anus and Perineum: visually normal.     Chaperone Present  Assessment:       ICD-10-CM ICD-9-CM   1. Postpartum follow-up  Z39.2 V24.2     Plan   Postpartum follow-up    Advised the pt to refrain from intercourse for 2 more weeks.     Follow up in about 1 year (around 7/22/2026). In addition to their scheduled follow up, the patient has also been instructed to follow up on as needed basis.     Francesca Mariscal MA    This note was transcribed by Francesca Mariscal CMA. There may be transcription errors as a result, however minimal. Effort has been made to ensure accuracy of transcription, but any obvious errors or omissions should be clarified with the author of the document.       I agree with the above documentation.            [1]   No current outpatient medications on file.     No current facility-administered medications for this visit.

## 2025-07-14 NOTE — PROGRESS NOTES
"Patient ID: [id number]   Chief Complaint: problem (Patient presents for a problem. Patient states she is 6 weeks postpartum, and after intercourse patient states she began to experience lots of pain and some bleeding. Patient states of a 2nd degree laceration during childbirth. Patient states "I looked and it looks like something may be torn" )    HPI:   Salma Gold is a 19 y.o.  here today for problem (Patient presents for a problem. Patient states she is 6 weeks postpartum, and after intercourse patient states she began to experience lots of pain and some bleeding. Patient states of a 2nd degree laceration during childbirth. Patient states "I looked and it looks like something may be torn" )    No LMP recorded.    Past Medical History:  has a past medical history of Asthma and HSV (herpes simplex virus) anogenital infection.    Surgical History:  has a past surgical history that includes TUBE IN EARS; [place] tooth extraction; and TONSILLECTOMY, ADENOIDECTOMY.    Family History: family history includes Cancer in her maternal grandmother and paternal grandfather.    Social History:  reports that she has never smoked. She has never used smokeless tobacco. She reports that she does not currently use alcohol. She reports that she does not use drugs.    Current Medications[1]    Patient has no known allergies.    No results found for this or any previous visit (from the past 24 hours).    Subjective:   Review of Systems    12 point review of systems conducted, negative except as stated in the history of present illness.   See HPI for details.    Objective:     Visit Vitals  /80   Pulse 69   Wt 79.4 kg (175 lb)   Breastfeeding Yes   BMI 29.12 kg/m²   Physical Exam  Constitutional:  General Appearance : alert, in no acute distress, normal, well nourished.  Neck/Thyroid:  Inspection/Palpation: normal.  Respiratory:  Respiratory Effort: normal.  Breast:  Right: Inspection/palpation: no discharge, no " masses present, no nipple retraction, no skin changes, no skin dimpling, no tenderness, no lymphadenopathy, no axillary mass, no axillary tenderness.  Left: Inspection/palpation: no discharge, no masses present, no nipple retraction, no skin changes, no skin dimpling, no tenderness, no lymphadenopathy, no axillary mass, no axillary tenderness.  Gastrointestinal:  Abdomen: no masses. no tender, nondistended.  Liver and spleen: normal  Hernias: no hernias present, no inguinal adenopathy.  Genitourinary:  External Genitalia: normal, no lesions.  Vagina: normal appearance, no abnormal discharge, no lesions.very small superficial abrasion to vaginal introitus. No s/s infection  Anus and Perineum: visually normal.   Chaperone Present  Assessment:       ICD-10-CM ICD-9-CM   1. Postpartum follow-up  Z39.2 V24.2     Plan   Postpartum follow-up    No intercourse will fu in 1-2 weeks  No follow-ups on file. In addition to their scheduled follow up, the patient has also been instructed to follow up on as needed basis.   PAVAN Sanabria       [1]   No current outpatient medications on file.     No current facility-administered medications for this visit.

## 2025-07-22 ENCOUNTER — OFFICE VISIT (OUTPATIENT)
Dept: OBSTETRICS AND GYNECOLOGY | Facility: CLINIC | Age: 20
End: 2025-07-22
Payer: COMMERCIAL

## 2025-07-22 VITALS
SYSTOLIC BLOOD PRESSURE: 100 MMHG | WEIGHT: 173 LBS | DIASTOLIC BLOOD PRESSURE: 70 MMHG | HEART RATE: 83 BPM | BODY MASS INDEX: 28.79 KG/M2